# Patient Record
Sex: MALE | Race: WHITE | NOT HISPANIC OR LATINO | Employment: FULL TIME | URBAN - METROPOLITAN AREA
[De-identification: names, ages, dates, MRNs, and addresses within clinical notes are randomized per-mention and may not be internally consistent; named-entity substitution may affect disease eponyms.]

---

## 2017-02-06 RX ORDER — OMEPRAZOLE 40 MG/1
CAPSULE, DELAYED RELEASE ORAL
Qty: 90 CAPSULE | Refills: 2 | Status: SHIPPED | OUTPATIENT
Start: 2017-02-06 | End: 2020-12-31 | Stop reason: CLARIF

## 2017-02-22 ENCOUNTER — LAB (OUTPATIENT)
Dept: LAB | Facility: HOSPITAL | Age: 52
End: 2017-02-22
Attending: UROLOGY

## 2017-02-22 ENCOUNTER — HOSPITAL ENCOUNTER (OUTPATIENT)
Dept: CARDIOLOGY | Facility: HOSPITAL | Age: 52
Discharge: HOME | End: 2017-02-22
Attending: UROLOGY | Admitting: UROLOGY

## 2017-02-22 ENCOUNTER — HOSPITAL ENCOUNTER (OUTPATIENT)
Dept: GENERAL RADIOLOGY | Facility: HOSPITAL | Age: 52
Discharge: HOME | End: 2017-02-22
Attending: UROLOGY

## 2017-02-22 ENCOUNTER — TRANSCRIBE ORDERS (OUTPATIENT)
Dept: LAB | Facility: HOSPITAL | Age: 52
End: 2017-02-22

## 2017-02-22 DIAGNOSIS — Z01.811 PRE-OP CHEST EXAM: ICD-10-CM

## 2017-02-22 DIAGNOSIS — Z01.818 PRE-OP TESTING: ICD-10-CM

## 2017-02-22 DIAGNOSIS — Z01.818 PRE-OP TESTING: Primary | ICD-10-CM

## 2017-02-22 LAB
BASOPHILS # BLD AUTO: 0.04 10*3/MM3 (ref 0–0.2)
BASOPHILS NFR BLD AUTO: 0.6 % (ref 0–1.5)
DEPRECATED RDW RBC AUTO: 44.2 FL (ref 37–54)
EOSINOPHIL # BLD AUTO: 0.1 10*3/MM3 (ref 0–0.7)
EOSINOPHIL NFR BLD AUTO: 1.6 % (ref 0.3–6.2)
ERYTHROCYTE [DISTWIDTH] IN BLOOD BY AUTOMATED COUNT: 13.8 % (ref 11.5–14.5)
HCT VFR BLD AUTO: 46.5 % (ref 40.4–52.2)
HGB BLD-MCNC: 16 G/DL (ref 13.7–17.6)
IMM GRANULOCYTES # BLD: 0.02 10*3/MM3 (ref 0–0.03)
IMM GRANULOCYTES NFR BLD: 0.3 % (ref 0–0.5)
LYMPHOCYTES # BLD AUTO: 1.52 10*3/MM3 (ref 0.9–4.8)
LYMPHOCYTES NFR BLD AUTO: 24.2 % (ref 19.6–45.3)
MCH RBC QN AUTO: 30.1 PG (ref 27–32.7)
MCHC RBC AUTO-ENTMCNC: 34.4 G/DL (ref 32.6–36.4)
MCV RBC AUTO: 87.4 FL (ref 79.8–96.2)
MONOCYTES # BLD AUTO: 0.42 10*3/MM3 (ref 0.2–1.2)
MONOCYTES NFR BLD AUTO: 6.7 % (ref 5–12)
NEUTROPHILS # BLD AUTO: 4.17 10*3/MM3 (ref 1.9–8.1)
NEUTROPHILS NFR BLD AUTO: 66.6 % (ref 42.7–76)
PLATELET # BLD AUTO: 232 10*3/MM3 (ref 140–500)
PMV BLD AUTO: 9.6 FL (ref 6–12)
RBC # BLD AUTO: 5.32 10*6/MM3 (ref 4.6–6)
WBC NRBC COR # BLD: 6.27 10*3/MM3 (ref 4.5–10.7)

## 2017-02-22 PROCEDURE — 93005 ELECTROCARDIOGRAM TRACING: CPT | Performed by: UROLOGY

## 2017-02-22 PROCEDURE — 36415 COLL VENOUS BLD VENIPUNCTURE: CPT

## 2017-02-22 PROCEDURE — 85025 COMPLETE CBC W/AUTO DIFF WBC: CPT

## 2017-02-22 PROCEDURE — 93010 ELECTROCARDIOGRAM REPORT: CPT | Performed by: INTERNAL MEDICINE

## 2017-02-22 PROCEDURE — 71020 HC CHEST PA AND LATERAL: CPT

## 2017-05-19 ENCOUNTER — OFFICE VISIT (OUTPATIENT)
Dept: FAMILY MEDICINE CLINIC | Facility: CLINIC | Age: 52
End: 2017-05-19

## 2017-05-19 VITALS
SYSTOLIC BLOOD PRESSURE: 120 MMHG | OXYGEN SATURATION: 99 % | TEMPERATURE: 98.9 F | WEIGHT: 251.6 LBS | DIASTOLIC BLOOD PRESSURE: 80 MMHG | HEIGHT: 72 IN | BODY MASS INDEX: 34.08 KG/M2 | HEART RATE: 106 BPM

## 2017-05-19 DIAGNOSIS — Z13.220 LIPID SCREENING: ICD-10-CM

## 2017-05-19 DIAGNOSIS — Z51.81 MEDICATION MONITORING ENCOUNTER: ICD-10-CM

## 2017-05-19 DIAGNOSIS — Z00.01 ENCOUNTER FOR GENERAL ADULT MEDICAL EXAMINATION WITH ABNORMAL FINDINGS: Primary | ICD-10-CM

## 2017-05-19 LAB
ALBUMIN SERPL-MCNC: 4.4 G/DL (ref 3.5–5.2)
ALBUMIN/GLOB SERPL: 1.2 G/DL
ALP SERPL-CCNC: 32 U/L (ref 39–117)
ALT SERPL W P-5'-P-CCNC: 34 U/L (ref 1–41)
ANION GAP SERPL CALCULATED.3IONS-SCNC: 13.3 MMOL/L
AST SERPL-CCNC: 32 U/L (ref 1–40)
BILIRUB SERPL-MCNC: 0.7 MG/DL (ref 0.1–1.2)
BUN BLD-MCNC: 18 MG/DL (ref 6–20)
BUN/CREAT SERPL: 14.2 (ref 7–25)
CALCIUM SPEC-SCNC: 9.7 MG/DL (ref 8.6–10.5)
CHLORIDE SERPL-SCNC: 102 MMOL/L (ref 98–107)
CHOLEST SERPL-MCNC: 170 MG/DL (ref 0–200)
CO2 SERPL-SCNC: 24.7 MMOL/L (ref 22–29)
CREAT BLD-MCNC: 1.27 MG/DL (ref 0.76–1.27)
GFR SERPL CREATININE-BSD FRML MDRD: 59 ML/MIN/1.73
GLOBULIN UR ELPH-MCNC: 3.7 GM/DL
GLUCOSE BLD-MCNC: 84 MG/DL (ref 65–99)
HDLC SERPL-MCNC: 32 MG/DL (ref 40–60)
LDLC SERPL CALC-MCNC: 97 MG/DL (ref 0–100)
LDLC/HDLC SERPL: 3.04 {RATIO}
POTASSIUM BLD-SCNC: 4.3 MMOL/L (ref 3.5–5.2)
PROT SERPL-MCNC: 8.1 G/DL (ref 6–8.5)
SODIUM BLD-SCNC: 140 MMOL/L (ref 136–145)
TRIGL SERPL-MCNC: 203 MG/DL (ref 0–150)
VLDLC SERPL-MCNC: 40.6 MG/DL (ref 5–40)

## 2017-05-19 PROCEDURE — 80061 LIPID PANEL: CPT | Performed by: INTERNAL MEDICINE

## 2017-05-19 PROCEDURE — 80053 COMPREHEN METABOLIC PANEL: CPT | Performed by: INTERNAL MEDICINE

## 2017-05-19 PROCEDURE — 99396 PREV VISIT EST AGE 40-64: CPT | Performed by: INTERNAL MEDICINE

## 2017-05-19 RX ORDER — TRAMADOL HYDROCHLORIDE 50 MG/1
50 TABLET ORAL EVERY 6 HOURS PRN
Qty: 30 TABLET | Refills: 0 | Status: SHIPPED | OUTPATIENT
Start: 2017-05-19 | End: 2018-01-05 | Stop reason: SDUPTHER

## 2017-05-31 ENCOUNTER — TELEPHONE (OUTPATIENT)
Dept: FAMILY MEDICINE CLINIC | Facility: CLINIC | Age: 52
End: 2017-05-31

## 2018-01-05 ENCOUNTER — OFFICE VISIT (OUTPATIENT)
Dept: FAMILY MEDICINE CLINIC | Facility: CLINIC | Age: 53
End: 2018-01-05

## 2018-01-05 VITALS
HEART RATE: 80 BPM | RESPIRATION RATE: 18 BRPM | DIASTOLIC BLOOD PRESSURE: 80 MMHG | SYSTOLIC BLOOD PRESSURE: 124 MMHG | BODY MASS INDEX: 31.69 KG/M2 | TEMPERATURE: 97.8 F | HEIGHT: 72 IN | OXYGEN SATURATION: 96 % | WEIGHT: 234 LBS

## 2018-01-05 DIAGNOSIS — M51.36 BULGING LUMBAR DISC: ICD-10-CM

## 2018-01-05 DIAGNOSIS — J01.01 ACUTE RECURRENT MAXILLARY SINUSITIS: Primary | ICD-10-CM

## 2018-01-05 PROBLEM — M51.369 BULGING LUMBAR DISC: Status: ACTIVE | Noted: 2018-01-05

## 2018-01-05 PROCEDURE — 99213 OFFICE O/P EST LOW 20 MIN: CPT | Performed by: NURSE PRACTITIONER

## 2018-01-05 RX ORDER — SILODOSIN 4 MG/1
4 CAPSULE ORAL
COMMUNITY
End: 2019-08-20 | Stop reason: SDUPTHER

## 2018-01-05 RX ORDER — BENZONATATE 100 MG/1
100 CAPSULE ORAL 3 TIMES DAILY PRN
Qty: 30 CAPSULE | Refills: 0 | Status: SHIPPED | OUTPATIENT
Start: 2018-01-05 | End: 2019-08-20

## 2018-01-05 RX ORDER — AZITHROMYCIN 250 MG/1
TABLET, FILM COATED ORAL
Qty: 6 TABLET | Refills: 0 | Status: SHIPPED | OUTPATIENT
Start: 2018-01-05 | End: 2019-08-20

## 2018-01-05 RX ORDER — TRAMADOL HYDROCHLORIDE 50 MG/1
50 TABLET ORAL EVERY 6 HOURS PRN
Qty: 30 TABLET | Refills: 0 | Status: SHIPPED | OUTPATIENT
Start: 2018-01-05 | End: 2019-08-20

## 2018-01-05 NOTE — PATIENT INSTRUCTIONS
erx zpack use as directed, increase H20 and rest, erx tessalon perles 100 prn cough, refill tramadol #30 lasts 6 mo, The patient has read and signed the [facility] Controlled Substance Contract UTD 05/16, kayla reviewed.  I will continue to see patient for regular follow up appointments.  They are well controlled on their medication.  KAYLA is updated every 3 months. The patient is aware of the potential for addiction and dependence

## 2018-01-05 NOTE — PROGRESS NOTES
Subjective   Vini Jones is a 52 y.o. male.     History of Present Illness   C/o prod cough, sinus tenderness and congestion, nasal congestion, PND, no sore throat or ear pain, no SOA or wheezing, no abd pain NV, no body aches, got flu shot this year, wife also sick at home, recent exposure to granddaughter who tested positive for RSV yesterday  With chronic LBP and bulging disc, usually sees Dr Cano and last lumbar xray 09/16, stable on tramadol 50 mg prn pain #30 lasts approx 6 mo and request refill today, last CS contract 05/17    The following portions of the patient's history were reviewed and updated as appropriate: allergies, current medications, past family history, past medical history, past social history, past surgical history and problem list.    Review of Systems   Constitutional: Negative for fever.   HENT: Positive for congestion, facial swelling, postnasal drip, sinus pain and sinus pressure. Negative for dental problem, drooling, ear discharge, ear pain, hearing loss, mouth sores, nosebleeds, rhinorrhea, sneezing, sore throat, tinnitus, trouble swallowing and voice change.    Respiratory: Positive for cough. Negative for shortness of breath and wheezing.    Cardiovascular: Negative for chest pain, palpitations and leg swelling.   Musculoskeletal: Positive for arthralgias and back pain.   Neurological: Negative for dizziness and headaches.   All other systems reviewed and are negative.      Objective   Physical Exam   Constitutional: He is oriented to person, place, and time. He appears well-developed and well-nourished.   HENT:   Head: Normocephalic and atraumatic.   Right Ear: Hearing normal. Tympanic membrane is erythematous.   Left Ear: Hearing normal. Tympanic membrane is erythematous.   Nose: Mucosal edema present. Right sinus exhibits maxillary sinus tenderness and frontal sinus tenderness. Left sinus exhibits maxillary sinus tenderness and frontal sinus tenderness.   Mouth/Throat:  Posterior oropharyngeal erythema present. No oropharyngeal exudate or posterior oropharyngeal edema.   Eyes: Conjunctivae and EOM are normal. Pupils are equal, round, and reactive to light.   Neck: Normal range of motion. Neck supple. No thyromegaly present.   Cardiovascular: Normal rate, regular rhythm and normal heart sounds.    Pulmonary/Chest: Effort normal and breath sounds normal.   Cough   Musculoskeletal: Normal range of motion. He exhibits tenderness (lumbar generalized midline with stiff ROM).   Lymphadenopathy:     He has cervical adenopathy.   Neurological: He is alert and oriented to person, place, and time.   Skin: Skin is warm and dry.   Psychiatric: He has a normal mood and affect. His behavior is normal. Judgment and thought content normal.   Vitals reviewed.      Assessment/Plan   Vini was seen today for cough.    Diagnoses and all orders for this visit:    Acute recurrent maxillary sinusitis    Bulging lumbar disc    Other orders  -     azithromycin (ZITHROMAX Z-AURELIO) 250 MG tablet; Take 2 tablets the first day, then 1 tablet daily for 4 days.  -     benzonatate (TESSALON PERLES) 100 MG capsule; Take 1 capsule by mouth 3 (Three) Times a Day As Needed for Cough.  -     traMADol (ULTRAM) 50 MG tablet; Take 1 tablet by mouth Every 6 (Six) Hours As Needed for Moderate Pain .    erx zpack use as directed, increase H20 and rest, erx tessalon perles 100 prn cough, refill tramadol #30 lasts 6 mo, The patient has read and signed the [facility] Controlled Substance Contract UTD 05/16, kayla reviewed.  I will continue to see patient for regular follow up appointments.  They are well controlled on their medication.  KAYLA is updated every 3 months. The patient is aware of the potential for addiction and dependence.

## 2018-03-14 ENCOUNTER — OFFICE VISIT (OUTPATIENT)
Dept: FAMILY MEDICINE CLINIC | Facility: CLINIC | Age: 53
End: 2018-03-14

## 2018-03-14 ENCOUNTER — HOSPITAL ENCOUNTER (OUTPATIENT)
Dept: CT IMAGING | Facility: HOSPITAL | Age: 53
Discharge: HOME | End: 2018-03-14
Admitting: NURSE PRACTITIONER

## 2018-03-14 VITALS
HEART RATE: 120 BPM | RESPIRATION RATE: 16 BRPM | BODY MASS INDEX: 31.07 KG/M2 | TEMPERATURE: 98.4 F | OXYGEN SATURATION: 97 % | HEIGHT: 72 IN | WEIGHT: 229.4 LBS | SYSTOLIC BLOOD PRESSURE: 80 MMHG | DIASTOLIC BLOOD PRESSURE: 60 MMHG

## 2018-03-14 DIAGNOSIS — R11.2 NAUSEA, VOMITING AND DIARRHEA: ICD-10-CM

## 2018-03-14 DIAGNOSIS — R19.7 NAUSEA, VOMITING AND DIARRHEA: ICD-10-CM

## 2018-03-14 DIAGNOSIS — R10.32 LLQ PAIN: ICD-10-CM

## 2018-03-14 DIAGNOSIS — R68.89 FLU-LIKE SYMPTOMS: Primary | ICD-10-CM

## 2018-03-14 LAB
CREAT BLDA-MCNC: 1.5 MG/DL (ref 0.6–1.3)
ERYTHROCYTE [DISTWIDTH] IN BLOOD BY AUTOMATED COUNT: 13.4 % (ref 4.5–15)
FLUAV AG NPH QL: NEGATIVE
FLUBV AG NPH QL IA: NEGATIVE
HCT VFR BLD AUTO: 49.7 % (ref 35–60)
HGB BLD-MCNC: 16.6 G/DL (ref 13.5–18)
LYMPHOCYTES # BLD AUTO: 0.7 10*3/MM3 (ref 1.2–3.4)
LYMPHOCYTES NFR BLD AUTO: 6.2 % (ref 21–51)
MCH RBC QN AUTO: 29.9 PG (ref 26.1–33.1)
MCHC RBC AUTO-ENTMCNC: 33.5 G/DL (ref 33–37)
MCV RBC AUTO: 89.5 FL (ref 80–99)
MONOCYTES # BLD AUTO: 0.2 10*3/MM3 (ref 0.1–0.6)
MONOCYTES NFR BLD AUTO: 1.7 % (ref 2–9)
NEUTROPHILS # BLD AUTO: 10.1 10*3/MM3 (ref 1.4–6.5)
NEUTROPHILS NFR BLD AUTO: 92.1 % (ref 42–75)
PLATELET # BLD AUTO: 249 10*3/MM3 (ref 150–450)
PMV BLD AUTO: 7.5 FL (ref 7.1–10.5)
RBC # BLD AUTO: 5.55 10*6/MM3 (ref 4–6)
WBC NRBC COR # BLD: 11 10*3/MM3 (ref 4.5–10)

## 2018-03-14 PROCEDURE — 0 IOPAMIDOL 61 % SOLUTION: Performed by: NURSE PRACTITIONER

## 2018-03-14 PROCEDURE — 82565 ASSAY OF CREATININE: CPT

## 2018-03-14 PROCEDURE — 99213 OFFICE O/P EST LOW 20 MIN: CPT | Performed by: NURSE PRACTITIONER

## 2018-03-14 PROCEDURE — 36415 COLL VENOUS BLD VENIPUNCTURE: CPT | Performed by: NURSE PRACTITIONER

## 2018-03-14 PROCEDURE — 85025 COMPLETE CBC W/AUTO DIFF WBC: CPT | Performed by: NURSE PRACTITIONER

## 2018-03-14 PROCEDURE — 74177 CT ABD & PELVIS W/CONTRAST: CPT

## 2018-03-14 PROCEDURE — 87804 INFLUENZA ASSAY W/OPTIC: CPT | Performed by: NURSE PRACTITIONER

## 2018-03-14 RX ORDER — ONDANSETRON 4 MG/1
4 TABLET, FILM COATED ORAL EVERY 8 HOURS PRN
Qty: 20 TABLET | Refills: 0 | Status: SHIPPED | OUTPATIENT
Start: 2018-03-14 | End: 2019-08-20

## 2018-03-14 RX ORDER — ALFUZOSIN HYDROCHLORIDE 10 MG/1
TABLET, EXTENDED RELEASE ORAL
COMMUNITY
Start: 2018-02-05 | End: 2019-08-20

## 2018-03-14 RX ADMIN — IOPAMIDOL 85 ML: 612 INJECTION, SOLUTION INTRAVENOUS at 18:15

## 2018-03-14 NOTE — PATIENT INSTRUCTIONS
CBC today, WBC 11.0, flu negative.   Stat CT abd today will call with results.   Encouraged bland diet advance as tolerated.   zofran 4mg every 8 hours as needed for nausea or vomiting.   If any worsening abd pain advised to go to the ER.   Increase fluid intake, get plenty of rest.   Patient agrees with plan of care and understands instructions. Call if worsening symptoms or any problems or concerns.

## 2018-03-14 NOTE — NURSING NOTE
Dr Ramirez read CT Abdomen/pelvis:  Minimal diverticulosis, no diverticulitis,  No acute.  Results then called to Dr bill Caballero who spoke directly to patient's wife.  May go home.  IV site clear, removed.  To home with wife, ambulatory, no acute distress.

## 2018-03-14 NOTE — PROGRESS NOTES
Subjective   Vini Jones is a 52 y.o. male.     History of Present Illness   C/o chills, aches, fever, HA, nausea, diarrhea, he did take advil this morning, he states low grade temp 99.7 at home, his wife is in the room, his wife has also been sick,symptoms started last night, he denies being around anyone sick. He denies vomiting, he denies sore throat, denies cough. He states he had cold last week, he did eat toast today, denies drinking liquids.  He did get the flu vaccine.  He does have hx of diverticulosis, he did have appendectomy and cholecystectomy. He states he has had left abd pain for years,he states he has generalized abd pain today.   He did vomit while in the room.     The following portions of the patient's history were reviewed and updated as appropriate: allergies, current medications, past family history, past medical history, past social history, past surgical history and problem list.    Review of Systems   Constitutional: Positive for appetite change, chills, diaphoresis, fatigue and fever.   HENT: Negative for congestion, ear pain, sinus pressure and sore throat.    Respiratory: Negative for cough and shortness of breath.    Cardiovascular: Negative for chest pain.   Gastrointestinal: Positive for abdominal pain, diarrhea, nausea and vomiting.   Musculoskeletal: Positive for myalgias. Negative for arthralgias.   Skin: Negative for pallor.   Neurological: Negative for dizziness, light-headedness and headaches.   All other systems reviewed and are negative.      Objective   Physical Exam   Constitutional: He is oriented to person, place, and time. He appears well-developed and well-nourished.   HENT:   Head: Normocephalic.   Right Ear: Tympanic membrane and ear canal normal.   Left Ear: Tympanic membrane and ear canal normal.   Nose: Nose normal.   Mouth/Throat: Uvula is midline, oropharynx is clear and moist and mucous membranes are normal.   Eyes: Pupils are equal, round, and reactive to  light.   Cardiovascular: Normal rate, regular rhythm and normal heart sounds.    Pulmonary/Chest: Effort normal and breath sounds normal. No respiratory distress. He has no decreased breath sounds. He has no wheezes. He has no rhonchi.   Abdominal: Soft. Bowel sounds are normal. He exhibits no distension. There is no hepatosplenomegaly. There is tenderness in the left lower quadrant.   Musculoskeletal: Normal range of motion.   Neurological: He is alert and oriented to person, place, and time.   Skin: Skin is warm and dry.   Psychiatric: He has a normal mood and affect. His behavior is normal.   Nursing note and vitals reviewed.        Assessment/Plan   Vini was seen today for generalized body aches.    Diagnoses and all orders for this visit:    Flu-like symptoms  -     Influenza Antigen, Rapid - Swab, Nasopharynx  -     CBC & Differential  -     CBC Auto Differential    Nausea, vomiting and diarrhea  -     CT Abdomen Pelvis With Contrast; Future    LLQ pain  -     CT Abdomen Pelvis With Contrast; Future    Other orders  -     ondansetron (ZOFRAN) 4 MG tablet; Take 1 tablet by mouth Every 8 (Eight) Hours As Needed for Nausea or Vomiting.      CBC today, WBC 11.0, flu negative.   Stat CT abd today will call with results.   Encouraged bland diet advance as tolerated.   zofran 4mg every 8 hours as needed for nausea or vomiting.   If any worsening abd pain advised to go to the ER.   Increase fluid intake, get plenty of rest.   Patient agrees with plan of care and understands instructions. Call if worsening symptoms or any problems or concerns.

## 2018-03-15 ENCOUNTER — TELEPHONE (OUTPATIENT)
Dept: FAMILY MEDICINE CLINIC | Facility: CLINIC | Age: 53
End: 2018-03-15

## 2018-03-15 NOTE — TELEPHONE ENCOUNTER
----- Message from ERIC Das sent at 3/15/2018  7:56 AM EDT -----  Please call patient with results. His CT abd was normal. It did however show enlarged prostate. Does he see urology for this and when was last prostate exam? Call office for f/u if symptoms persist or ER if any worsening abd pain.    [facility]

## 2018-03-15 NOTE — TELEPHONE ENCOUNTER
----- Message from ERIC Das sent at 3/15/2018  7:56 AM EDT -----  Please call patient with results. His CT abd was normal. It did however show enlarged prostate. Does he see urology for this and when was last prostate exam? Call office for f/u if symptoms persist or ER if any worsening abd pain.    Pt informed of CT results. Pt said he does see a Urologist and last saw on about 6 mos ago,he has a FU with Uro in about 2 wks

## 2018-07-15 ENCOUNTER — APPOINTMENT (OUTPATIENT)
Dept: GENERAL RADIOLOGY | Facility: HOSPITAL | Age: 53
End: 2018-07-15

## 2018-07-15 ENCOUNTER — HOSPITAL ENCOUNTER (EMERGENCY)
Facility: HOSPITAL | Age: 53
Discharge: HOME | End: 2018-07-15
Attending: EMERGENCY MEDICINE | Admitting: EMERGENCY MEDICINE

## 2018-07-15 VITALS
HEIGHT: 72 IN | RESPIRATION RATE: 16 BRPM | TEMPERATURE: 98.5 F | OXYGEN SATURATION: 94 % | WEIGHT: 235 LBS | DIASTOLIC BLOOD PRESSURE: 82 MMHG | HEART RATE: 87 BPM | SYSTOLIC BLOOD PRESSURE: 119 MMHG | BODY MASS INDEX: 31.83 KG/M2

## 2018-07-15 DIAGNOSIS — R33.8 ACUTE URINARY RETENTION: Primary | ICD-10-CM

## 2018-07-15 PROCEDURE — 51702 INSERT TEMP BLADDER CATH: CPT

## 2018-07-15 PROCEDURE — 74018 RADEX ABDOMEN 1 VIEW: CPT

## 2018-07-15 PROCEDURE — 99283 EMERGENCY DEPT VISIT LOW MDM: CPT

## 2018-07-15 NOTE — ED NOTES
"Pt reports that the last time he had a BM was 4 days ago. Pt reports that he is also having difficulty urinating. \"I am trickling.\" pt reports pain in the lower abd and in his \"penis.\"     Vanessa Pearce RN  07/15/18 0359    "

## 2018-07-15 NOTE — ED NOTES
Lag catheter bag placed. Patient educated on catheter care. Patient and patient's wife verbalized understanding.      Selina Nichols RN  07/15/18 0670

## 2018-07-15 NOTE — ED NOTES
Drainage bag at 700cc, clamped at this time. Will continue to monitor.     Alaina Ashby RN  07/15/18 5764

## 2018-07-15 NOTE — ED PROVIDER NOTES
" EMERGENCY DEPARTMENT ENCOUNTER    CHIEF COMPLAINT  Chief Complaint: Urinary retention  History given by: patient   History limited by: n/a  Room Number: 13/13  PMD: MD Dr Felix Will, urology    HPI:  Pt is a 52 y.o. male who presents complaining of urinary retention. Pt states that he has only been able to \"dribble\". Pt also complains of constipation for the past 4 days. Pt states that he took Mirolax, but has only been able to pass liquid stool. Pt states that he has a hx of BPH.     Onset: gradual  Timing: constant   Location: urinary   Radiation: n/a  Quality: retention, only able to pass small amounts of urine  Intensity/Severity: severe   Progression: unchanged  Associated Symptoms: constipation   Aggravating Factors: none  Alleviating Factors: none  Previous Episodes: hx of BPH    PAST MEDICAL HISTORY  Active Ambulatory Problems     Diagnosis Date Noted   • Bulging lumbar disc 01/05/2018     Resolved Ambulatory Problems     Diagnosis Date Noted   • No Resolved Ambulatory Problems     Past Medical History:   Diagnosis Date   • Abdominal pain    • Enlarged prostate    • GERD (gastroesophageal reflux disease)        PAST SURGICAL HISTORY  Past Surgical History:   Procedure Laterality Date   • BIOPSY PROSTATE NEEDLE / PUNCH / INCISIONAL     • COLONOSCOPY         FAMILY HISTORY  Family History   Problem Relation Age of Onset   • Heart disease Father    • Asthma Father    • Heart attack Father        SOCIAL HISTORY  Social History     Social History   • Marital status:      Spouse name: N/A   • Number of children: N/A   • Years of education: N/A     Occupational History   • Not on file.     Social History Main Topics   • Smoking status: Never Smoker   • Smokeless tobacco: Never Used   • Alcohol use No   • Drug use: No   • Sexual activity: Not on file     Other Topics Concern   • Not on file     Social History Narrative   • No narrative on file       ALLERGIES  Patient has no known " allergies.    REVIEW OF SYSTEMS  Review of Systems   Constitutional: Negative for chills and fever.   HENT: Negative for sore throat.    Gastrointestinal: Positive for constipation. Negative for nausea and vomiting.   Genitourinary: Positive for difficulty urinating. Negative for dysuria.   Musculoskeletal: Negative for back pain.   Skin: Negative for rash.   Psychiatric/Behavioral: The patient is not nervous/anxious.        PHYSICAL EXAM  ED Triage Vitals   Temp Heart Rate Resp BP SpO2   07/15/18 0359 07/15/18 0359 07/15/18 0359 07/15/18 0422 07/15/18 0359   98.5 °F (36.9 °C) 114 18 134/82 98 %      Temp src Heart Rate Source Patient Position BP Location FiO2 (%)   07/15/18 0359 -- 07/15/18 0422 07/15/18 0422 --   Tympanic  Lying Right arm        Physical Exam   Constitutional: He is oriented to person, place, and time. No distress.   HENT:   Head: Normocephalic and atraumatic.   Eyes: EOM are normal. Pupils are equal, round, and reactive to light.   Neck: Normal range of motion. Neck supple.   Cardiovascular: Normal rate, regular rhythm and normal heart sounds.    Pulmonary/Chest: Effort normal and breath sounds normal. No respiratory distress.   Abdominal: Soft. There is no tenderness. There is no rebound and no guarding.   Musculoskeletal: Normal range of motion. He exhibits no edema.   Neurological: He is alert and oriented to person, place, and time. He has normal sensation and normal strength.   Skin: Skin is warm and dry.   Psychiatric: Mood and affect normal.   Nursing note and vitals reviewed.      RADIOLOGY  XR Abdomen KUB   Preliminary Result   No acute findings in the abdomen.                           I ordered the above noted radiological studies. Interpreted by radiologist. Reviewed by me in PACS.       PROCEDURES  Procedures      PROGRESS AND CONSULTS       04;20  Bladder scan shows >999 in the bladder. Sifuentes catheter ordered.     04:23  BP- 134/82 HR- 114 Temp- 98.5 °F (36.9 °C) (Tympanic) O2 sat-  98%  Pt reports improvement after palacios cathter. Plan for XR abd kub. Pt understands and agrees with the plan, all questions answered.    04;29  XR abd kub ordered.     05:36  BP- 119/82 HR- 87 Temp- 98.5 °F (36.9 °C) (Tympanic) O2 sat- 94%  Rechecked the patient who is in NAD and is resting comfortably. Advised pt that the XR shows no obstruction. Plan for discharge, pt is to f/u with his urologist. Pt understands and agrees with the plan, all questions answered.    MEDICAL DECISION MAKING  Results were reviewed/discussed with the patient and they were also made aware of online access. Pt also made aware that some labs, such as cultures, will not be resulted during ER visit and follow up with PMD is necessary.     MDM  Number of Diagnoses or Management Options     Amount and/or Complexity of Data Reviewed  Tests in the radiology section of CPT®: reviewed and ordered (XR abd kub shows NAD)    Patient Progress  Patient progress: stable         DIAGNOSIS  Final diagnoses:   Acute urinary retention       DISPOSITION  DISCHARGE    Patient discharged in stable condition.    Reviewed implications of results, diagnosis, meds, responsibility to follow up, warning signs and symptoms of possible worsening, potential complications and reasons to return to ER.    Patient/Family voiced understanding of above instructions.    Discussed plan for discharge, as there is no emergent indication for admission. Patient referred to primary care provider for BP management due to today's BP. Pt/family is agreeable and understands need for follow up and repeat testing.  Pt is aware that discharge does not mean that nothing is wrong but it indicates no emergency is present that requires admission and they must continue care with follow-up as given below or physician of their choice.     FOLLOW-UP  Narayan Cano Jr., MD  [address] [place], [place] [id number] [phone]    Schedule an appointment as soon as possible for a visit        Felix Pearce MD  [address] [facility] [address]  [phone]    Schedule an appointment as soon as possible for a visit            Medication List      No changes were made to your prescriptions during this visit.       Latest Documented Vital Signs:  As of 6:03 AM  BP- 119/82 HR- 87 Temp- 98.5 °F (36.9 °C) (Tympanic) O2 sat- 94%    --  Documentation assistance provided by zoey Oreilly for Dr Hooks.  Information recorded by the zoey was done at my direction and has been verified and validated by me.     Em Oreilly  07/15/18 0554       Micky Hooks MD  07/15/18 0602

## 2018-07-18 ENCOUNTER — TELEPHONE (OUTPATIENT)
Dept: SOCIAL WORK | Facility: HOSPITAL | Age: 53
End: 2018-07-18

## 2018-09-14 ENCOUNTER — APPOINTMENT (OUTPATIENT)
Dept: PREADMISSION TESTING | Facility: HOSPITAL | Age: 53
End: 2018-09-14

## 2019-07-24 ENCOUNTER — OFFICE VISIT (OUTPATIENT)
Dept: ORTHOPEDIC SURGERY | Facility: CLINIC | Age: 54
End: 2019-07-24

## 2019-07-24 VITALS — WEIGHT: 235 LBS | BODY MASS INDEX: 31.83 KG/M2 | TEMPERATURE: 98.1 F | HEIGHT: 72 IN

## 2019-07-24 DIAGNOSIS — M77.8 LEFT WRIST TENDONITIS: ICD-10-CM

## 2019-07-24 DIAGNOSIS — M65.9 TENOSYNOVITIS OF LEFT WRIST: Primary | ICD-10-CM

## 2019-07-24 PROCEDURE — 20605 DRAIN/INJ JOINT/BURSA W/O US: CPT | Performed by: NURSE PRACTITIONER

## 2019-07-24 PROCEDURE — 73110 X-RAY EXAM OF WRIST: CPT | Performed by: NURSE PRACTITIONER

## 2019-07-24 PROCEDURE — 99203 OFFICE O/P NEW LOW 30 MIN: CPT | Performed by: NURSE PRACTITIONER

## 2019-07-24 RX ORDER — SILODOSIN 8 MG/1
CAPSULE ORAL
COMMUNITY
Start: 2019-05-22

## 2019-07-24 RX ADMIN — METHYLPREDNISOLONE ACETATE 80 MG: 80 INJECTION, SUSPENSION INTRA-ARTICULAR; INTRALESIONAL; INTRAMUSCULAR; SOFT TISSUE at 15:20

## 2019-07-24 NOTE — PROGRESS NOTES
"Patient: Vini Jones    YOB: 1965    Medical Record Number: [id number]    Chief Complaints:  Left wrist pain    History of Present Illness:     53 y.o. Male patient who presents with recent history of left wrist pain.  Onset of pain began 6 weeks ago.  Recalls using a  for \"6 hours\" while cleaning out his garage.  Later that night and the next 2 days his pain was intense.  Reports his pain improved somewhat, but has been more sensitive over the last 2 weeks.  Describes his pain as mild, constant, and aching, but sharp at times.  Lifting and pushing aggravate his pain.  Symptoms alleviated by rest and wrist splint.  Localizes the majority of his  pain to lateral aspect of wrist over radial styloid, but also reports discomfort over the medial aspect of the wrist.  Denies any paresthesias, numbness or weakness.  No other associated symptoms.  Patient is right hand dominant.    Allergies: No Known Allergies    Home Medications:    Current Outpatient Medications:   •  silodosin (RAPAFLO) 8 MG capsule capsule, , Disp: , Rfl:   •  alfuzosin (UROXATRAL) 10 MG 24 hr tablet, , Disp: , Rfl:   •  AXIRON 30 MG/ACT solution, , Disp: , Rfl:   •  azithromycin (ZITHROMAX Z-AURELIO) 250 MG tablet, Take 2 tablets the first day, then 1 tablet daily for 4 days., Disp: 6 tablet, Rfl: 0  •  benzonatate (TESSALON PERLES) 100 MG capsule, Take 1 capsule by mouth 3 (Three) Times a Day As Needed for Cough., Disp: 30 capsule, Rfl: 0  •  omeprazole (priLOSEC) 40 MG capsule, TAKE 1 CAPSULE DAILY, Disp: 90 capsule, Rfl: 2  •  ondansetron (ZOFRAN) 4 MG tablet, Take 1 tablet by mouth Every 8 (Eight) Hours As Needed for Nausea or Vomiting., Disp: 20 tablet, Rfl: 0  •  silodosin (RAPAFLO) 4 MG capsule capsule, Take 4 mg by mouth Daily With Breakfast., Disp: , Rfl:   •  traMADol (ULTRAM) 50 MG tablet, Take 1 tablet by mouth Every 6 (Six) Hours As Needed for Moderate Pain ., Disp: 30 tablet, Rfl: 0    Past Medical History: " "  Diagnosis Date   • Abdominal pain    • Enlarged prostate    • GERD (gastroesophageal reflux disease)      Past Surgical History:   Procedure Laterality Date   • BIOPSY PROSTATE NEEDLE / PUNCH / INCISIONAL     • COLONOSCOPY         Social History     Occupational History   • Not on file   Tobacco Use   • Smoking status: Never Smoker   • Smokeless tobacco: Never Used   Substance and Sexual Activity   • Alcohol use: No   • Drug use: No   • Sexual activity: Defer       Social History     Social History Narrative   • Not on file     Family History   Problem Relation Age of Onset   • Heart disease Father    • Asthma Father    • Heart attack Father        Review of Systems:      Constitutional: Denies fever, shaking or chills   Eyes: Denies change in visual acuity   HEENT: Denies nasal congestion or sore throat   Respiratory: Denies cough or shortness of breath   Cardiovascular: Denies chest pain or edema  Endocrine: Denies tremors, palpitations, intolerance of heat or cold, polyuria, polydipsia.  GI: Denies abdominal pain, nausea, vomiting, bloody stools or diarrhea  : Denies frequency, urgency, incontinence, retention, or nocturia.  Musculoskeletal: Denies numbness tingling or loss of motor function except as above  Integument: Denies rash, lesion or ulceration   Neurologic: Denies headache or focal weakness, deficits  Heme: Denies epistaxis, spontaneous or excessive bleeding, epistaxis, hematuria, melena, fatigue, enlarged or tender lymph nodes.      All other pertinent positives and negatives as noted above in HPI.    Physical Exam: 53 y.o.  male    Vitals:    07/24/19 1444   Temp: 98.1 °F (36.7 °C)   Weight: 107 kg (235 lb)   Height: 182.9 cm (72\")       General:  Patient is awake and alert.  Appears in no acute distress or discomfort.    Psych:  Affect and demeanor are appropriate.    Eyes:  Conjunctiva and sclera appear grossly normal.  Eyes track well and EOM seem to be intact.    Ears:  No gross abnormalities. "  Hearing adequate for the exam.    Dentition:  No gross abnormalities noted.    Cardiovascular:  Regular rate and rhythm.    Lungs:  Good chest expansion.  Breathing unlabored.    Lymph:  No palpable masses or adenopathy in left upper extremity.    Left upper extremity:   Skin is benign.  No obvious swellings, masses or atrophy.  No palpable masses or adenopathy.  Focal tenderness noted over radial aspect of wrist and radial styloid.  Slight tenderness over the medial portion of the wrist.  Full elbow, wrist ROM.  No instability.  Positive Finkelstein's test.  Negative Tinel's, Phalen's over carpal tunnel.  Good , pinch, finger and thumb abduction strength.  Intact sensation.  Good radial pulse.  Brisk cap refill         Radiology:   Dr. Snow and I reviewed the x-rays together.  AP, oblique, and lateral views of the left wrist are ordered by myself and reviewed to evaluate the patient's complaint.  No comparison films are immediately available.  The x-rays show no obvious acute abnormalities, lesions, masses, significant degenerative changes, or other concerning findings.    Assessment:  1.  Left DeQuervain's tenosynovitis  2.  Left wrist tendinitis    Plan:  We discussed all available treatments for this condition in detail, both surgical and non-surgical.  With regards to conservative treatment, we discussed anti-inflammatories, injections, and use of a splint.  I have recommended that we start with an injection for the DeQuervain's.  For his wrist in general, we discussed activity modification and use of a wrist brace or ace wrap over the next 2 weeks to allow the wrist to rest..    The risks, benefits, and alternatives to an injection were thoroughly discussed and the patient consented to proceed.    Patient will follow up with me in 4 weeks for re-evaluation.     Deidre Curiel, APRN    07/24/2019      CC to Narayan Cano Jr., MD      Medium Joint Arthrocentesis: L radiocarpal  Date/Time: 7/24/2019  3:20 PM  Consent given by: patient  Site marked: site marked  Timeout: Immediately prior to procedure a time out was called to verify the correct patient, procedure, equipment, support staff and site/side marked as required   Supporting Documentation  Indications: pain   Procedure Details  Location: wrist - L radiocarpal  Preparation: Patient was prepped and draped in the usual sterile fashion  Needle gauge: 27g.  Approach: anteromedial  Medications administered: 80 mg methylPREDNISolone acetate 80 MG/ML (1 cc Lidocaine 2% NDC: 91604-389-91 LOT: [id number] EXP: 12/31/21)  Patient tolerance: patient tolerated the procedure well with no immediate complications

## 2019-07-25 RX ORDER — METHYLPREDNISOLONE ACETATE 80 MG/ML
80 INJECTION, SUSPENSION INTRA-ARTICULAR; INTRALESIONAL; INTRAMUSCULAR; SOFT TISSUE
Status: COMPLETED | OUTPATIENT
Start: 2019-07-24 | End: 2019-07-24

## 2019-08-20 ENCOUNTER — OFFICE VISIT (OUTPATIENT)
Dept: FAMILY MEDICINE CLINIC | Facility: CLINIC | Age: 54
End: 2019-08-20

## 2019-08-20 VITALS
SYSTOLIC BLOOD PRESSURE: 126 MMHG | DIASTOLIC BLOOD PRESSURE: 84 MMHG | OXYGEN SATURATION: 98 % | HEART RATE: 80 BPM | BODY MASS INDEX: 32.97 KG/M2 | HEIGHT: 72 IN | TEMPERATURE: 98.9 F | WEIGHT: 243.4 LBS

## 2019-08-20 DIAGNOSIS — M54.50 CHRONIC LOW BACK PAIN WITHOUT SCIATICA, UNSPECIFIED BACK PAIN LATERALITY: ICD-10-CM

## 2019-08-20 DIAGNOSIS — Z51.81 MEDICATION MONITORING ENCOUNTER: ICD-10-CM

## 2019-08-20 DIAGNOSIS — G89.29 CHRONIC LOW BACK PAIN WITHOUT SCIATICA, UNSPECIFIED BACK PAIN LATERALITY: ICD-10-CM

## 2019-08-20 DIAGNOSIS — Z12.11 ENCOUNTER FOR COLORECTAL CANCER SCREENING: ICD-10-CM

## 2019-08-20 DIAGNOSIS — Z12.12 ENCOUNTER FOR COLORECTAL CANCER SCREENING: ICD-10-CM

## 2019-08-20 DIAGNOSIS — L72.3 SEBACEOUS CYST OF LEFT AXILLA: Primary | ICD-10-CM

## 2019-08-20 PROCEDURE — 99214 OFFICE O/P EST MOD 30 MIN: CPT | Performed by: INTERNAL MEDICINE

## 2019-08-20 RX ORDER — TRAMADOL HYDROCHLORIDE 50 MG/1
50 TABLET ORAL EVERY 6 HOURS PRN
Qty: 30 TABLET | Refills: 0 | Status: SHIPPED | OUTPATIENT
Start: 2019-08-20 | End: 2020-03-03 | Stop reason: SDUPTHER

## 2019-08-20 RX ORDER — CLINDAMYCIN HYDROCHLORIDE 300 MG/1
300 CAPSULE ORAL 3 TIMES DAILY
Qty: 30 CAPSULE | Refills: 0 | Status: SHIPPED | OUTPATIENT
Start: 2019-08-20 | End: 2019-08-30

## 2019-08-20 NOTE — PROGRESS NOTES
Subjective   Vini Jones is a 54 y.o. male.   Left axillary lump present for about 2 weeks time no particular injury to that.  No drainage increased fever or known infection  History of Present Illness   Recent left axillary lump new patient not a problem problems with that does not have a history of hidradenitis Suppuritiva.  No unexplained weight loss night sweats sensation of fatigue or feeling bad.  No  lymph nodes popping up elsewhere.  Patient does need refill of tramadol needs to medication Viagra that he takes his as needed for low back pain mainly when he does yard work or tends to overdo it does not have sciatica type complaints with it typically prescriptive lasting about a year but we do need updated UDS per joanie and Sg patient asked about other screenings apparently has had a history: Polyps he thinks he is a little bit late by about a year on colonoscopy so we will schedule him he is seeing Dr. Shun Ca before so we will schedule with him for that.  No recent changes in bowel habits etc.  Drug allergies are none.  Family history for heart disease specifically heart failure asthma and heart attack non-smoker  Review of Systems   All other systems reviewed and are negative.      Objective   Vitals:    08/20/19 0812   BP: 126/84   Pulse: 80   Temp: 98.9 °F (37.2 °C)   SpO2: 98%   Weight: 110 kg (243 lb 6.4 oz)     Physical Exam   Constitutional: He appears well-developed and well-nourished.   HENT:   Head: Normocephalic and atraumatic.   Eyes: Conjunctivae are normal. Pupils are equal, round, and reactive to light.   Neck:   No increased cervical nodes   Cardiovascular: Normal rate, regular rhythm and normal heart sounds.   Pulmonary/Chest: Effort normal and breath sounds normal.   Abdominal: Soft. Bowel sounds are normal.   Musculoskeletal:   There is a 2 cm superficial mass left axilla do not see a poor given his proximity to his service thinks is like a sebaceous cyst it is sore but not  red or and not draining.  Do not find any other masses within the left axilla do not find any lymphadenopathy either axilla or cervical or supra/infra clavicular.   Neurological: He is alert.   Unremarkable gait and station   Skin: Skin is warm and dry.   Nursing note and vitals reviewed.      No results found for: INR    Procedures    Assessment/Plan   1.  Sebaceous cyst back to the left axilla plan treatment with clindamycin 300 3 times daily x10 days if the soreness got gone 10 days if mass is not totally resolved in 1 month's time we will get general surgery referral.  If this should worsen or increase in size we will proceed with a surgeon.  S patient will let me know if the mass is not totally resolved in 1 month's time for probable referral at that point    2.  Chronic lumbar back pain plan refill tramadol    3.  Medication monitoring as an #2 with [facility] drug contract Sg    4.  Encounter for colorectal screening referred to Dr. Ibrahim for colonoscopy with a personal history for colon polyps.    Much of this encounter note is an electronic transcription/translation of spoken language to printed text.  The electronic translation of spoken language may permit erroneous, or at times, nonsensical words or phrases to be inadvertently transcribed.  Although I have reviewed the note for such errors, some may still exist. If there are questions or for further clarification, please contact me.

## 2019-08-21 ENCOUNTER — OFFICE VISIT (OUTPATIENT)
Dept: ORTHOPEDIC SURGERY | Facility: CLINIC | Age: 54
End: 2019-08-21

## 2019-08-21 VITALS — HEIGHT: 72 IN | TEMPERATURE: 98 F | WEIGHT: 243 LBS | BODY MASS INDEX: 32.91 KG/M2

## 2019-08-21 DIAGNOSIS — M77.8 LEFT WRIST TENDONITIS: ICD-10-CM

## 2019-08-21 DIAGNOSIS — M65.9 TENOSYNOVITIS OF LEFT WRIST: Primary | ICD-10-CM

## 2019-08-21 PROCEDURE — 99212 OFFICE O/P EST SF 10 MIN: CPT | Performed by: NURSE PRACTITIONER

## 2019-08-21 NOTE — PROGRESS NOTES
Chief Complaint:  Follow up left wrist pain    HPI:  Mr. Jones returns to clinic for follow up left wrist pain.  Reports the shot helped tremendously.  He rarely has intermittent pain.  He is very happy with his improvement.     Exam:  Left wrist:  Skin is benign.  No gross abnormalities on inspection.  No palpable masses or adenopathy.  No effusion.  Focal tenderness over the radial aspect of wrist and radial styloid only with deep palpation.  Full symmetric motion.  No instability.  Good strength with resisted forearm pronation and supination, wrist flexion and extension, ulnar and radial deviation, , pinch, finger and thumb abduction.  Negative Finkelstein's test.  Sensation is intact.  Brisk capillary refill in the fingers with good skin turgor.    Imaging:  None taken    Assessment:  1.  Left DeQuervain's tenosynovitis  2.  Left wrist tendinitis    Plan:  He seems to be doing very well.  Going forward, he will follow up with me as needed.     ERIC Livingston     08/21/2019

## 2019-08-23 LAB — CONV REPORT SUMMARY: NORMAL

## 2019-10-02 ENCOUNTER — TELEPHONE (OUTPATIENT)
Dept: FAMILY MEDICINE CLINIC | Facility: CLINIC | Age: 54
End: 2019-10-02

## 2019-10-02 DIAGNOSIS — H91.93 DECREASED HEARING OF BOTH EARS: Primary | ICD-10-CM

## 2019-10-10 PROBLEM — Z86.010 SURVEILLANCE DUE TO PRIOR COLONIC NEOPLASIA: Status: ACTIVE | Noted: 2019-10-11

## 2019-10-11 ENCOUNTER — OFFICE (AMBULATORY)
Dept: URBAN - METROPOLITAN AREA PATHOLOGY 4 | Facility: PATHOLOGY | Age: 54
End: 2019-10-11
Payer: COMMERCIAL

## 2019-10-11 ENCOUNTER — AMBULATORY SURGICAL CENTER (AMBULATORY)
Dept: URBAN - METROPOLITAN AREA SURGERY 17 | Facility: SURGERY | Age: 54
End: 2019-10-11
Payer: COMMERCIAL

## 2019-10-11 DIAGNOSIS — D12.4 BENIGN NEOPLASM OF DESCENDING COLON: ICD-10-CM

## 2019-10-11 DIAGNOSIS — D12.2 BENIGN NEOPLASM OF ASCENDING COLON: ICD-10-CM

## 2019-10-11 DIAGNOSIS — Z86.010 PERSONAL HISTORY OF COLONIC POLYPS: ICD-10-CM

## 2019-10-11 LAB
GI HISTOLOGY: A. UNSPECIFIED: (no result)
GI HISTOLOGY: B. UNSPECIFIED: (no result)
GI HISTOLOGY: PDF REPORT: (no result)

## 2019-10-11 PROCEDURE — 45380 COLONOSCOPY AND BIOPSY: CPT | Mod: 59,33 | Performed by: INTERNAL MEDICINE

## 2019-10-11 PROCEDURE — 45380 COLONOSCOPY AND BIOPSY: CPT | Mod: 33,59 | Performed by: INTERNAL MEDICINE

## 2019-10-11 PROCEDURE — 88305 TISSUE EXAM BY PATHOLOGIST: CPT | Mod: 33 | Performed by: INTERNAL MEDICINE

## 2019-10-11 PROCEDURE — 45385 COLONOSCOPY W/LESION REMOVAL: CPT | Mod: 33 | Performed by: INTERNAL MEDICINE

## 2019-11-25 ENCOUNTER — OFFICE VISIT (OUTPATIENT)
Dept: FAMILY MEDICINE CLINIC | Facility: CLINIC | Age: 54
End: 2019-11-25

## 2019-11-25 VITALS
BODY MASS INDEX: 32.86 KG/M2 | SYSTOLIC BLOOD PRESSURE: 128 MMHG | DIASTOLIC BLOOD PRESSURE: 84 MMHG | HEIGHT: 72 IN | HEART RATE: 81 BPM | TEMPERATURE: 98.5 F | OXYGEN SATURATION: 97 % | WEIGHT: 242.6 LBS

## 2019-11-25 DIAGNOSIS — Z13.220 LIPID SCREENING: ICD-10-CM

## 2019-11-25 DIAGNOSIS — R03.0 ELEVATED BP WITHOUT DIAGNOSIS OF HYPERTENSION: ICD-10-CM

## 2019-11-25 DIAGNOSIS — L30.9 DERMATITIS: ICD-10-CM

## 2019-11-25 DIAGNOSIS — R06.09 DOE (DYSPNEA ON EXERTION): Primary | ICD-10-CM

## 2019-11-25 LAB
ALBUMIN SERPL-MCNC: 4.5 G/DL (ref 3.5–5.2)
ALBUMIN/GLOB SERPL: 1.3 G/DL
ALP SERPL-CCNC: 39 U/L (ref 39–117)
ALT SERPL W P-5'-P-CCNC: 26 U/L (ref 1–41)
ANION GAP SERPL CALCULATED.3IONS-SCNC: 12.4 MMOL/L (ref 5–15)
AST SERPL-CCNC: 28 U/L (ref 1–40)
BILIRUB SERPL-MCNC: 0.4 MG/DL (ref 0.2–1.2)
BUN BLD-MCNC: 24 MG/DL (ref 6–20)
BUN/CREAT SERPL: 17.3 (ref 7–25)
CALCIUM SPEC-SCNC: 9.7 MG/DL (ref 8.6–10.5)
CHLORIDE SERPL-SCNC: 103 MMOL/L (ref 98–107)
CHOLEST SERPL-MCNC: 165 MG/DL (ref 0–200)
CO2 SERPL-SCNC: 25.6 MMOL/L (ref 22–29)
CREAT BLD-MCNC: 1.39 MG/DL (ref 0.76–1.27)
GFR SERPL CREATININE-BSD FRML MDRD: 53 ML/MIN/1.73
GLOBULIN UR ELPH-MCNC: 3.6 GM/DL
GLUCOSE BLD-MCNC: 96 MG/DL (ref 65–99)
HDLC SERPL-MCNC: 30 MG/DL (ref 40–60)
LDLC SERPL CALC-MCNC: 90 MG/DL (ref 0–100)
LDLC/HDLC SERPL: 3.01 {RATIO}
POTASSIUM BLD-SCNC: 4.7 MMOL/L (ref 3.5–5.2)
PROT SERPL-MCNC: 8.1 G/DL (ref 6–8.5)
SODIUM BLD-SCNC: 141 MMOL/L (ref 136–145)
TRIGL SERPL-MCNC: 224 MG/DL (ref 0–150)
VLDLC SERPL-MCNC: 44.8 MG/DL (ref 5–40)

## 2019-11-25 PROCEDURE — 80061 LIPID PANEL: CPT | Performed by: INTERNAL MEDICINE

## 2019-11-25 PROCEDURE — 99214 OFFICE O/P EST MOD 30 MIN: CPT | Performed by: INTERNAL MEDICINE

## 2019-11-25 PROCEDURE — 93000 ELECTROCARDIOGRAM COMPLETE: CPT | Performed by: INTERNAL MEDICINE

## 2019-11-25 PROCEDURE — 71046 X-RAY EXAM CHEST 2 VIEWS: CPT | Performed by: INTERNAL MEDICINE

## 2019-11-25 PROCEDURE — 80053 COMPREHEN METABOLIC PANEL: CPT | Performed by: INTERNAL MEDICINE

## 2019-11-25 RX ORDER — METHYLPREDNISOLONE 4 MG/1
TABLET ORAL
Qty: 21 TABLET | Refills: 0 | Status: SHIPPED | OUTPATIENT
Start: 2019-11-25 | End: 2020-12-31

## 2019-11-25 RX ORDER — SILDENAFIL CITRATE 20 MG/1
20 TABLET ORAL 3 TIMES DAILY PRN
COMMUNITY

## 2019-11-25 NOTE — PROGRESS NOTES
Subjective   Vini Jones is a 54 y.o. male.  Patient with recent dyspnea on exertion over the last few weeks time more so when going up steps or any phlegm Ever there is no pressure or heaviness sensation she has just shortness of breath unusual patient also rash on penis thinks this occurred in setting him golfing and use restroom 20s in the rough.  Not sure what brought she might have touched also follow-up on blood pressure which is ranging borderline  Body mass index is 32.9 kg/m².  History of Present Illness   Patient here with several problems including dyspnea on exertion fairly recent nature no chest pain or pressure with that.  Not hear herself wheeze when he attempts or other obvious explanations this occurs only with some activity not other times family history for heart disease in her father.  Is a non-smoker has some borderline blood pressure readings.  Today's is 132/90 no formal diagnosis of high blood pressure did eye exam with eye findings suggesting he has hypertension per eye specialist.  Here for follow-up blood pressure readings as referenced in the flirting at the /90 range.  bp 130-140-/80-90  Family history positive for heart disease and asthma.  Smoker.  No drug allergies.  Papular rash on penis occurred in setting of him golf outing going to the rough he will use restroom they are touching his penis does not have rash elsewhere.  Not where specifically any rash that he has.Review of Systems   Respiratory: Positive for shortness of breath.    Skin: Positive for rash.   All other systems reviewed and are negative.      Objective   Vitals:    11/25/19 1535   BP: 132/90   Pulse: 81   Temp: 98.5 °F (36.9 °C)   SpO2: 97%   Weight: 110 kg (242 lb 9.6 oz)     Physical Exam   Constitutional: He appears well-developed and well-nourished.   HENT:   Head: Normocephalic and atraumatic.   Eyes: Conjunctivae are normal. Pupils are equal, round, and reactive to light.   Neck:   No carotid  bruits.  Left right carotid pulses are 2+.   Cardiovascular: Normal rate, regular rhythm and normal heart sounds.   Left and right radial pulse are 2+.   Pulmonary/Chest: Effort normal and breath sounds normal.   Abdominal: Soft. Bowel sounds are normal.   Neurological: He is alert.   Unremarkable gait and station   Skin: Skin is warm and dry.   Papular penile rash shaft of penis excludes the head.  Parents does suggest contact dermatitis no active lesion was suggest this might be genital herpes we will do blood test looking for same but impression is that of a contact dermatitis undefined.  No rash elsewhere by patient's report   Nursing note and vitals reviewed.      No results found for: INR      ECG 12 Lead  Date/Time: 11/25/2019 3:55 PM  Performed by: Narayan Cano Jr., MD  Authorized by: Narayan Cano Jr., MD   Comparison: compared with previous ECG   Comparison to previous ECG: Comparison EKG from 2/22/2017 without changes noted.  Sinus rhythm with T wave inversion in lead III.  Rhythm: sinus rhythm  T inversion: III    Clinical impression: normal ECG  Comments: Normal sinus rhythm without change from 2/22/2017 T wave inversion in lead III only.  Rate is 78.  DE interval is 166 MS, QRS is 98 MS, QT intervals 339 MS.          Chest x-ray AP and lateral obtained.  No comparison available.  No active parenchymal infiltrate seen.  No bony or soft tissue normalities are noted.  Assessment/Plan     1.  Dyspnea on exertion proceed with stress echo    2.  Elevated blood pressure plan observation for now watch salt intake get several readings away from office call blood pressure readings in 2-3 weeks.    3.  Dermatitis of penile shaft plan Medrol Dosepak call if not well in 7 days time and as needed additionally we are waiting the HSV-2 lab on him    4.  Lipid screening await pending results with further recommendations to follow  To begin baby aspirin 81 g daily if having chest pain or progressive shortness  of breath go to ER.    Much of this encounter note is an electronic transcription/translation of spoken language to printed text.  The electronic translation of spoken language may permit erroneous, or at times, nonsensical words or phrases to be inadvertently transcribed.  Although I have reviewed the note for such errors, some may still exist. If there are questions or for further clarification, please contact me.

## 2019-11-26 LAB — HSV2 IGG SER IA-ACNC: <0.91 INDEX (ref 0–0.9)

## 2019-11-27 DIAGNOSIS — R79.89 CREATININE ELEVATION: Primary | ICD-10-CM

## 2019-12-16 ENCOUNTER — HOSPITAL ENCOUNTER (OUTPATIENT)
Dept: CARDIOLOGY | Facility: HOSPITAL | Age: 54
Discharge: HOME | End: 2019-12-16
Admitting: INTERNAL MEDICINE

## 2019-12-16 VITALS
BODY MASS INDEX: 33.88 KG/M2 | HEART RATE: 110 BPM | DIASTOLIC BLOOD PRESSURE: 90 MMHG | SYSTOLIC BLOOD PRESSURE: 116 MMHG | WEIGHT: 242 LBS | HEIGHT: 71 IN | OXYGEN SATURATION: 98 %

## 2019-12-16 DIAGNOSIS — R06.09 DOE (DYSPNEA ON EXERTION): ICD-10-CM

## 2019-12-16 LAB
AORTIC ROOT ANNULUS: 2.3 CM
ASCENDING AORTA: 3.6 CM
BH CV ECHO MEAS - ACS: 1.8 CM
BH CV ECHO MEAS - AO MAX PG: 4.7 MMHG
BH CV ECHO MEAS - AO V2 MAX: 108.6 CM/SEC
BH CV ECHO MEAS - ASC AORTA: 3.6 CM
BH CV ECHO MEAS - BSA(HAYCOCK): 2.4 M^2
BH CV ECHO MEAS - BSA: 2.3 M^2
BH CV ECHO MEAS - BZI_BMI: 32.8 KILOGRAMS/M^2
BH CV ECHO MEAS - BZI_METRIC_HEIGHT: 182.9 CM
BH CV ECHO MEAS - BZI_METRIC_WEIGHT: 109.8 KG
BH CV ECHO MEAS - EDV(MOD-SP4): 116 ML
BH CV ECHO MEAS - EDV(TEICH): 65.6 ML
BH CV ECHO MEAS - EF(CUBED): 67.6 %
BH CV ECHO MEAS - EF(MOD-BP): 67 %
BH CV ECHO MEAS - EF(MOD-SP4): 66.4 %
BH CV ECHO MEAS - EF(TEICH): 59.8 %
BH CV ECHO MEAS - ESV(MOD-SP4): 39 ML
BH CV ECHO MEAS - ESV(TEICH): 26.4 ML
BH CV ECHO MEAS - FS: 31.3 %
BH CV ECHO MEAS - IVS/LVPW: 0.99
BH CV ECHO MEAS - IVSD: 1.2 CM
BH CV ECHO MEAS - LAT PEAK E' VEL: 6 CM/SEC
BH CV ECHO MEAS - LV DIASTOLIC VOL/BSA (35-75): 50.2 ML/M^2
BH CV ECHO MEAS - LV MASS(C)D: 164.3 GRAMS
BH CV ECHO MEAS - LV MASS(C)DI: 71.1 GRAMS/M^2
BH CV ECHO MEAS - LV SYSTOLIC VOL/BSA (12-30): 16.9 ML/M^2
BH CV ECHO MEAS - LVIDD: 3.9 CM
BH CV ECHO MEAS - LVIDS: 2.7 CM
BH CV ECHO MEAS - LVLD AP4: 8.6 CM
BH CV ECHO MEAS - LVLS AP4: 6.8 CM
BH CV ECHO MEAS - LVPWD: 1.2 CM
BH CV ECHO MEAS - MED PEAK E' VEL: 5 CM/SEC
BH CV ECHO MEAS - MV A DUR: 0.14 SEC
BH CV ECHO MEAS - MV A MAX VEL: 76.6 CM/SEC
BH CV ECHO MEAS - MV DEC SLOPE: 136.2 CM/SEC^2
BH CV ECHO MEAS - MV DEC TIME: 0.31 SEC
BH CV ECHO MEAS - MV E MAX VEL: 42.2 CM/SEC
BH CV ECHO MEAS - MV E/A: 0.55
BH CV ECHO MEAS - MV P1/2T MAX VEL: 43.2 CM/SEC
BH CV ECHO MEAS - MV P1/2T: 92.9 MSEC
BH CV ECHO MEAS - MVA P1/2T LCG: 5.1 CM^2
BH CV ECHO MEAS - MVA(P1/2T): 2.4 CM^2
BH CV ECHO MEAS - PULM A REVS DUR: 0.1 SEC
BH CV ECHO MEAS - PULM A REVS VEL: 31.9 CM/SEC
BH CV ECHO MEAS - PULM DIAS VEL: 39 CM/SEC
BH CV ECHO MEAS - PULM S/D: 1.3
BH CV ECHO MEAS - PULM SYS VEL: 50 CM/SEC
BH CV ECHO MEAS - SI(CUBED): 17.3 ML/M^2
BH CV ECHO MEAS - SI(MOD-SP4): 33.3 ML/M^2
BH CV ECHO MEAS - SI(TEICH): 17 ML/M^2
BH CV ECHO MEAS - SV(CUBED): 39.9 ML
BH CV ECHO MEAS - SV(MOD-SP4): 77 ML
BH CV ECHO MEAS - SV(TEICH): 39.3 ML
BH CV ECHO MEAS - TAPSE (>1.6): 1.9 CM2
BH CV ECHO MEASUREMENTS AVERAGE E/E' RATIO: 7.67
BH CV STRESS BP STAGE 1: NORMAL
BH CV STRESS BP STAGE 2: NORMAL
BH CV STRESS DURATION MIN STAGE 1: 3
BH CV STRESS DURATION MIN STAGE 2: 3
BH CV STRESS DURATION MIN STAGE 3: 1
BH CV STRESS DURATION SEC STAGE 1: 0
BH CV STRESS DURATION SEC STAGE 2: 0
BH CV STRESS DURATION SEC STAGE 3: 0
BH CV STRESS ECHO POST STRESS EJECTION FRACTION EF: 79 %
BH CV STRESS GRADE STAGE 1: 10
BH CV STRESS GRADE STAGE 2: 12
BH CV STRESS GRADE STAGE 3: 14
BH CV STRESS HR STAGE 1: 132
BH CV STRESS HR STAGE 2: 144
BH CV STRESS HR STAGE 3: 158
BH CV STRESS METS STAGE 1: 5
BH CV STRESS METS STAGE 2: 7.5
BH CV STRESS METS STAGE 3: 10
BH CV STRESS PROTOCOL 1: NORMAL
BH CV STRESS SPEED STAGE 1: 1.7
BH CV STRESS SPEED STAGE 2: 2.5
BH CV STRESS SPEED STAGE 3: 3.4
BH CV STRESS STAGE 1: 1
BH CV STRESS STAGE 2: 2
BH CV STRESS STAGE 3: 3
BH CV XLRA - RV BASE: 3 CM
BH CV XLRA - RV LENGTH: 7.2 CM
BH CV XLRA - RV MID: 3 CM
BH CV XLRA - TDI S': 13 CM/SEC
LEFT ATRIUM VOLUME INDEX: 16 ML/M2
MAXIMAL PREDICTED HEART RATE: 164 BPM
PERCENT MAX PREDICTED HR: 96.34 %
SINUS: 3.1 CM
STJ: 3.4 CM
STRESS BASELINE BP: NORMAL MMHG
STRESS BASELINE HR: 110 BPM
STRESS O2 SAT REST: 98 %
STRESS PERCENT HR: 113 %
STRESS POST ESTIMATED WORKLOAD: 8 METS
STRESS POST EXERCISE DUR MIN: 7 MIN
STRESS POST EXERCISE DUR SEC: 0 SEC
STRESS POST PEAK BP: NORMAL MMHG
STRESS POST PEAK HR: 158 BPM
STRESS TARGET HR: 139 BPM

## 2019-12-16 PROCEDURE — 93325 DOPPLER ECHO COLOR FLOW MAPG: CPT | Performed by: INTERNAL MEDICINE

## 2019-12-16 PROCEDURE — 93352 ADMIN ECG CONTRAST AGENT: CPT | Performed by: INTERNAL MEDICINE

## 2019-12-16 PROCEDURE — 93320 DOPPLER ECHO COMPLETE: CPT

## 2019-12-16 PROCEDURE — 93350 STRESS TTE ONLY: CPT

## 2019-12-16 PROCEDURE — 93320 DOPPLER ECHO COMPLETE: CPT | Performed by: INTERNAL MEDICINE

## 2019-12-16 PROCEDURE — 93016 CV STRESS TEST SUPVJ ONLY: CPT | Performed by: INTERNAL MEDICINE

## 2019-12-16 PROCEDURE — 93325 DOPPLER ECHO COLOR FLOW MAPG: CPT

## 2019-12-16 PROCEDURE — 25010000002 PERFLUTREN (DEFINITY) 8.476 MG IN SODIUM CHLORIDE 0.9 % 10 ML INJECTION: Performed by: INTERNAL MEDICINE

## 2019-12-16 PROCEDURE — 93018 CV STRESS TEST I&R ONLY: CPT | Performed by: INTERNAL MEDICINE

## 2019-12-16 PROCEDURE — 93017 CV STRESS TEST TRACING ONLY: CPT

## 2019-12-16 PROCEDURE — 93350 STRESS TTE ONLY: CPT | Performed by: INTERNAL MEDICINE

## 2019-12-16 RX ADMIN — PERFLUTREN 3 ML: 6.52 INJECTION, SUSPENSION INTRAVENOUS at 09:44

## 2019-12-19 DIAGNOSIS — R06.09 DOE (DYSPNEA ON EXERTION): Primary | ICD-10-CM

## 2020-03-03 NOTE — TELEPHONE ENCOUNTER
Get updated Sg MIRZA see why he takes this    See where we are with urine drug screening and contract.

## 2020-03-03 NOTE — TELEPHONE ENCOUNTER
PT CALLED REQUESTING A REFILL FOR traMADol (ULTRAM) 50 MG tablet     [facility] CONFIRMED     PT CALL BACK   [phone]

## 2020-03-05 RX ORDER — TRAMADOL HYDROCHLORIDE 50 MG/1
50 TABLET ORAL EVERY 6 HOURS PRN
Qty: 30 TABLET | Refills: 0 | Status: SHIPPED | OUTPATIENT
Start: 2020-03-05 | End: 2020-06-03 | Stop reason: SDUPTHER

## 2020-06-08 RX ORDER — TRAMADOL HYDROCHLORIDE 50 MG/1
50 TABLET ORAL EVERY 6 HOURS PRN
Qty: 90 TABLET | Refills: 0 | Status: SHIPPED | OUTPATIENT
Start: 2020-06-08 | End: 2020-11-16

## 2020-06-08 NOTE — TELEPHONE ENCOUNTER
Did refill and inform patient he will need to come in for urine drug screen [facility] drug contract in August as well as visit  
Get updated Sg first also see if this is for back versus other problem.  See what pharmacy he wants that routed to  
PT INFORMED   
Sg on your desk.kathy  
Initial (On Arrival)

## 2020-08-10 ENCOUNTER — OFFICE VISIT (OUTPATIENT)
Dept: FAMILY MEDICINE CLINIC | Facility: CLINIC | Age: 55
End: 2020-08-10

## 2020-08-10 VITALS
HEART RATE: 72 BPM | DIASTOLIC BLOOD PRESSURE: 68 MMHG | TEMPERATURE: 98 F | WEIGHT: 240.4 LBS | BODY MASS INDEX: 33.65 KG/M2 | HEIGHT: 71 IN | OXYGEN SATURATION: 98 % | SYSTOLIC BLOOD PRESSURE: 124 MMHG

## 2020-08-10 DIAGNOSIS — G89.29 CHRONIC MIDLINE LOW BACK PAIN WITHOUT SCIATICA: Primary | ICD-10-CM

## 2020-08-10 DIAGNOSIS — M54.50 CHRONIC MIDLINE LOW BACK PAIN WITHOUT SCIATICA: Primary | ICD-10-CM

## 2020-08-10 DIAGNOSIS — Z51.81 MEDICATION MONITORING ENCOUNTER: ICD-10-CM

## 2020-08-10 PROCEDURE — 99213 OFFICE O/P EST LOW 20 MIN: CPT | Performed by: INTERNAL MEDICINE

## 2020-08-10 RX ORDER — TRAMADOL HYDROCHLORIDE 50 MG/1
50 TABLET ORAL EVERY 6 HOURS PRN
Qty: 60 TABLET | Refills: 0 | Status: SHIPPED | OUTPATIENT
Start: 2020-08-10 | End: 2021-06-30 | Stop reason: HOSPADM

## 2020-08-10 NOTE — PROGRESS NOTES
Subjective Chronic back pain worse when he plays golf cannot take NSAIDs for this requesting refill on tramadol.  Vini Jones is a 55 y.o. male. Body mass index is 33.53 kg/m².  History of Present Illness   Patient followed by nephrology has been advised several times to avoid NSAIDs does state is more so when he plays golf than other times.  Requests refill on tramadol which he takes predominately in this setting of playing golf.  Need updated drug contract and urine drug screen Sg on this.  Overall is doing okay    Review of Systems   Constitutional: Negative.    HENT: Negative.    Eyes: Negative.    Respiratory: Negative.    Cardiovascular: Negative.    Gastrointestinal: Negative.    Endocrine: Negative.    Genitourinary: Negative.    Musculoskeletal: Positive for back pain.   Skin: Negative.    Allergic/Immunologic: Negative.    Neurological: Negative.    Hematological: Negative.    Psychiatric/Behavioral: Negative.        Objective   Vitals:    08/10/20 0856   BP: 124/68   Pulse: 72   Temp: 98 °F (36.7 °C)   SpO2: 98%   Weight: 109 kg (240 lb 6.4 oz)     Physical Exam   Constitutional: He appears well-developed and well-nourished.   HENT:   Head: Normocephalic and atraumatic.   Eyes: Pupils are equal, round, and reactive to light. Conjunctivae are normal.   Cardiovascular: Normal rate, regular rhythm and normal heart sounds.   Pulmonary/Chest: Effort normal and breath sounds normal.   Musculoskeletal:   No discomfort over back with palpation today   Nursing note and vitals reviewed.    No results found for: INR    Procedures    Assessment/Plan   Chronic lumbar pain    Medication monitoring with updated UDS per contract and Sg for tramadol.  Quantity 60 be the amount he received today.    Much of this encounter note is an electronic transcription/translation of spoken language to printed text.  The electronic translation of spoken language may permit erroneous, or at times, nonsensical words or  phrases to be inadvertently transcribed.  Although I have reviewed the note for such errors, some may still exist. If there are questions or for further clarification, please contact me.

## 2020-08-13 LAB — CONV REPORT SUMMARY: NORMAL

## 2020-11-16 RX ORDER — TRAMADOL HYDROCHLORIDE 50 MG/1
TABLET ORAL
Qty: 90 TABLET | Refills: 0 | Status: SHIPPED | OUTPATIENT
Start: 2020-11-16 | End: 2021-04-22 | Stop reason: SDUPTHER

## 2020-12-31 ENCOUNTER — OFFICE VISIT (OUTPATIENT)
Dept: FAMILY MEDICINE CLINIC | Facility: CLINIC | Age: 55
End: 2020-12-31

## 2020-12-31 VITALS
HEART RATE: 84 BPM | TEMPERATURE: 97.6 F | WEIGHT: 244.6 LBS | OXYGEN SATURATION: 97 % | HEIGHT: 71 IN | SYSTOLIC BLOOD PRESSURE: 118 MMHG | DIASTOLIC BLOOD PRESSURE: 72 MMHG | BODY MASS INDEX: 34.24 KG/M2

## 2020-12-31 DIAGNOSIS — E78.5 ELEVATED LIPIDS: ICD-10-CM

## 2020-12-31 DIAGNOSIS — R79.89 ELEVATED SERUM CREATININE: ICD-10-CM

## 2020-12-31 DIAGNOSIS — M54.42 ACUTE LEFT-SIDED LOW BACK PAIN WITH LEFT-SIDED SCIATICA: Primary | ICD-10-CM

## 2020-12-31 DIAGNOSIS — I10 HYPERTENSION, ESSENTIAL: ICD-10-CM

## 2020-12-31 DIAGNOSIS — Z51.81 MEDICATION MONITORING ENCOUNTER: ICD-10-CM

## 2020-12-31 DIAGNOSIS — L98.9 SKIN LESION OF CHEST WALL: ICD-10-CM

## 2020-12-31 DIAGNOSIS — M25.561 RIGHT KNEE PAIN, UNSPECIFIED CHRONICITY: ICD-10-CM

## 2020-12-31 LAB
ALBUMIN SERPL-MCNC: 4.4 G/DL (ref 3.5–5.2)
ALBUMIN/GLOB SERPL: 1.5 G/DL
ALP SERPL-CCNC: 40 U/L (ref 39–117)
ALT SERPL W P-5'-P-CCNC: 21 U/L (ref 1–41)
ANION GAP SERPL CALCULATED.3IONS-SCNC: 8.5 MMOL/L (ref 5–15)
AST SERPL-CCNC: 21 U/L (ref 1–40)
BILIRUB SERPL-MCNC: 0.5 MG/DL (ref 0–1.2)
BUN SERPL-MCNC: 19 MG/DL (ref 6–20)
BUN/CREAT SERPL: 16.8 (ref 7–25)
CALCIUM SPEC-SCNC: 9.3 MG/DL (ref 8.6–10.5)
CHLORIDE SERPL-SCNC: 103 MMOL/L (ref 98–107)
CHOLEST SERPL-MCNC: 170 MG/DL (ref 0–200)
CO2 SERPL-SCNC: 26.5 MMOL/L (ref 22–29)
CREAT SERPL-MCNC: 1.13 MG/DL (ref 0.76–1.27)
ERYTHROCYTE [DISTWIDTH] IN BLOOD BY AUTOMATED COUNT: 13.3 % (ref 12.3–15.4)
GFR SERPL CREATININE-BSD FRML MDRD: 67 ML/MIN/1.73
GLOBULIN UR ELPH-MCNC: 3 GM/DL
GLUCOSE SERPL-MCNC: 92 MG/DL (ref 65–99)
HCT VFR BLD AUTO: 49.7 % (ref 37.5–51)
HDLC SERPL-MCNC: 35 MG/DL (ref 40–60)
HGB BLD-MCNC: 16.6 G/DL (ref 13–17.7)
LDLC SERPL CALC-MCNC: 114 MG/DL (ref 0–100)
LDLC/HDLC SERPL: 3.19 {RATIO}
LYMPHOCYTES # BLD AUTO: 1.8 10*3/MM3 (ref 0.7–3.1)
LYMPHOCYTES NFR BLD AUTO: 30.2 % (ref 19.6–45.3)
MCH RBC QN AUTO: 30.2 PG (ref 26.6–33)
MCHC RBC AUTO-ENTMCNC: 33.3 G/DL (ref 31.5–35.7)
MCV RBC AUTO: 90.5 FL (ref 79–97)
MONOCYTES # BLD AUTO: 0.4 10*3/MM3 (ref 0.1–0.9)
MONOCYTES NFR BLD AUTO: 7.3 % (ref 5–12)
NEUTROPHILS NFR BLD AUTO: 3.8 10*3/MM3 (ref 1.7–7)
NEUTROPHILS NFR BLD AUTO: 62.5 % (ref 42.7–76)
PLATELET # BLD AUTO: 267 10*3/MM3 (ref 140–450)
PMV BLD AUTO: 7.1 FL (ref 6–12)
POTASSIUM SERPL-SCNC: 4.8 MMOL/L (ref 3.5–5.2)
PROT SERPL-MCNC: 7.4 G/DL (ref 6–8.5)
RBC # BLD AUTO: 5.5 10*6/MM3 (ref 4.14–5.8)
SODIUM SERPL-SCNC: 138 MMOL/L (ref 136–145)
TRIGL SERPL-MCNC: 116 MG/DL (ref 0–150)
VLDLC SERPL-MCNC: 21 MG/DL (ref 5–40)
WBC # BLD AUTO: 6 10*3/MM3 (ref 3.4–10.8)

## 2020-12-31 PROCEDURE — 72100 X-RAY EXAM L-S SPINE 2/3 VWS: CPT | Performed by: INTERNAL MEDICINE

## 2020-12-31 PROCEDURE — 80061 LIPID PANEL: CPT | Performed by: INTERNAL MEDICINE

## 2020-12-31 PROCEDURE — 85025 COMPLETE CBC W/AUTO DIFF WBC: CPT | Performed by: INTERNAL MEDICINE

## 2020-12-31 PROCEDURE — 73560 X-RAY EXAM OF KNEE 1 OR 2: CPT | Performed by: INTERNAL MEDICINE

## 2020-12-31 PROCEDURE — 99214 OFFICE O/P EST MOD 30 MIN: CPT | Performed by: INTERNAL MEDICINE

## 2020-12-31 PROCEDURE — 80053 COMPREHEN METABOLIC PANEL: CPT | Performed by: INTERNAL MEDICINE

## 2020-12-31 PROCEDURE — 36415 COLL VENOUS BLD VENIPUNCTURE: CPT | Performed by: INTERNAL MEDICINE

## 2020-12-31 RX ORDER — DOXYCYCLINE 100 MG/1
100 CAPSULE ORAL 2 TIMES DAILY
COMMUNITY
Start: 2020-12-28 | End: 2021-04-22

## 2020-12-31 RX ORDER — FAMOTIDINE 20 MG/1
TABLET, FILM COATED ORAL
COMMUNITY
Start: 2020-11-09 | End: 2022-01-27 | Stop reason: SDUPTHER

## 2020-12-31 RX ORDER — AMLODIPINE BESYLATE 2.5 MG/1
2.5 TABLET ORAL EVERY EVENING
COMMUNITY
Start: 2020-11-09 | End: 2022-01-27 | Stop reason: SDUPTHER

## 2020-12-31 NOTE — PROGRESS NOTES
Subjective   Vini Jones is a 55 y.o. male.  Patient here for several reasons increased pain lower back recently seem to flare in setting of playing golf has not backed off since that time he has had no mid back trouble is having some low-grade sciatica in the left leg which generally does not occur also cause some ongoing right knee pain but no locking giving he is follow-up on blood pressure lipids has lesion on chest wall as well.  Body mass index is 34.11 kg/m².  History of Present Illness multiple reasons for visit including lower back pain of several weeks duration not responding to relative rest began in the setting playing golf does have low-grade static in the left leg no other specific injuries ongoing discomfort with movement is unable to play golf now which clearly flares the pain patient does have some right knee discomfort also last several months no specific injury no locking giving this ongoing discomfort with walking.  No known prior knee trouble has follow-up skin lesion on his chest which she wishes to have checked out this needs follow-up on his lipids he is recheck on creatinine which has been elevated does need updated medication monitoring for his tramadol which he takes for back primarily has had episodes when he was having sciatica with referral into the left testicle also  Back worse  2-3 wks  Began afrter golfing some sciatica  MS positive heart disease and asthma.  Non-smoker.  Drug allergies are none  Review of Systems   Constitutional: Negative.    HENT: Negative.    Eyes: Negative.    Respiratory: Negative.    Cardiovascular: Negative.    Gastrointestinal: Negative.    Endocrine: Negative.    Genitourinary: Negative.    Musculoskeletal: Positive for back pain.   Skin: Negative.    Allergic/Immunologic: Negative.    Neurological: Negative.    Hematological: Negative.    Psychiatric/Behavioral: Negative.        Objective   Vitals:    12/31/20 1003   BP: 118/72   Pulse: 84   Temp:  97.6 °F (36.4 °C)   SpO2: 97%   Weight: 111 kg (244 lb 9.6 oz)     Physical Exam  Vitals signs and nursing note reviewed.   Constitutional:       Appearance: Normal appearance.   HENT:      Head: Normocephalic and atraumatic.   Eyes:      Conjunctiva/sclera: Conjunctivae normal.      Pupils: Pupils are equal, round, and reactive to light.   Cardiovascular:      Rate and Rhythm: Normal rate and regular rhythm.      Heart sounds: Normal heart sounds.   Pulmonary:      Effort: Pulmonary effort is normal.      Breath sounds: Normal breath sounds.   Abdominal:      General: Abdomen is flat. Bowel sounds are normal.      Palpations: Abdomen is soft.   Musculoskeletal:      Comments: Patient mild discomfort lower lumbar spine more to the left.  Does ambulate fairly well    Right knee is normal stressing in AP, lateral, medial motion does have a mild discomfort with internal and external rotation lower leg extension.  No true focal tenderness and no effusion is noted.   Skin:     General: Skin is warm and dry.      Comments: 3 mm raised lesion mid chest site to the right of the mid sternum somewhat pale no visible pore nonfluctuant no increased heat but is increasing in size palpation observation we will get general surgery to look at this see if removal is appropriate   Neurological:      General: No focal deficit present.      Mental Status: He is alert.      Comments: Knee jerks 1+ bilaterally, negative straight leg raises bilaterally         No results found for: INR    Procedures  LS-spine x-ray PA and lateral views obtained.  Does have moderate narrowing at L5-S1 and L4-L5.  No other bony or soft tissue maladies are noted.  No comparison available.    X-ray right knee 2 views obtained.  No comparison available.  Does have mild narrowing of the medial meniscus otherwise unremarkable.  Assessment/Plan   1.  Acute left-sided lumbar pain history of intermittent back trouble this is flared in setting of golf but does not  respond to relative rest like and has a starkly plan MRI of the lumbar spine at [facility] does request open MRI due to claustrophobia    2.  Right knee pain plan refer to orthopedic surgeon    3.  Skin lesion mid chest around sternum we will send him to Dr. Lidia Greene her group he is using it route before for what appears to be a groin lesion do not see any overt poor aside from the increasing in size no other worrisome features nonetheless we will get general surgery to evaluate    4.  Elevated lipids get updated values    5.  Elevated creatinine up a value on that    6 .  Medication monitoring for all TRAM with updated urine drug screen.  There are no diagnoses linked to this encounter.     7.  Hypertension controlled continue present medicine recheck in 6-12 months   Much of this encounter note is an electronic transcription/translation of spoken language to printed text.  The electronic translation of spoken language may permit erroneous, or at times, nonsensical words or phrases to be inadvertently transcribed.  Although I have reviewed the note for such errors, some may still exist. If there are questions or for further clarification, please contact me.

## 2021-01-01 DIAGNOSIS — E78.00 ELEVATED LDL CHOLESTEROL LEVEL: Primary | ICD-10-CM

## 2021-01-19 ENCOUNTER — OFFICE VISIT (OUTPATIENT)
Dept: SURGERY | Facility: CLINIC | Age: 56
End: 2021-01-19

## 2021-01-19 VITALS — HEIGHT: 72 IN | WEIGHT: 245.4 LBS | BODY MASS INDEX: 33.24 KG/M2

## 2021-01-19 DIAGNOSIS — L72.3 SEBACEOUS CYST: Primary | ICD-10-CM

## 2021-01-19 PROCEDURE — 11400 EXC TR-EXT B9+MARG 0.5 CM<: CPT | Performed by: SURGERY

## 2021-01-19 PROCEDURE — 99242 OFF/OP CONSLTJ NEW/EST SF 20: CPT | Performed by: SURGERY

## 2021-01-19 PROCEDURE — 88304 TISSUE EXAM BY PATHOLOGIST: CPT | Performed by: SURGERY

## 2021-01-19 RX ORDER — ASPIRIN 81 MG/1
81 TABLET, CHEWABLE ORAL DAILY
COMMUNITY

## 2021-01-19 NOTE — PROGRESS NOTES
Subjective   Vini Jones is a 55 y.o. male who presents to the office in surgical consultation from Henrry Guajardo MD for a cyst of the anterior chest wall.    History of Present Illness     The patient has a cyst of the anterior chest wall that has been present for several years.  It is slowly getting larger and intermittently symptomatic, especially when pressure is applied.  There has never been any trauma to the area.  There has never been any drainage.    Review of Systems   Constitutional: Negative for fatigue and fever.   Respiratory: Negative for chest tightness and shortness of breath.    Cardiovascular: Negative for chest pain and palpitations.   Gastrointestinal: Negative for abdominal pain, blood in stool, constipation, diarrhea, nausea and vomiting.     Past Medical History:   Diagnosis Date   • Abdominal pain    • Enlarged prostate    • GERD (gastroesophageal reflux disease)    • Hypertension    • Sleep apnea     C-Pap     Past Surgical History:   Procedure Laterality Date   • APPENDECTOMY     • BIOPSY PROSTATE NEEDLE / PUNCH / INCISIONAL     • CHOLECYSTECTOMY     • COLONOSCOPY       Family History   Problem Relation Age of Onset   • Heart disease Father    • Asthma Father    • Heart attack Father      Social History     Socioeconomic History   • Marital status:      Spouse name: Not on file   • Number of children: Not on file   • Years of education: Not on file   • Highest education level: Not on file   Tobacco Use   • Smoking status: Never Smoker   • Smokeless tobacco: Never Used   Substance and Sexual Activity   • Alcohol use: No   • Drug use: No   • Sexual activity: Defer       Objective   Physical Exam  Constitutional:       Appearance: Normal appearance. He is well-developed. He is not toxic-appearing.   Eyes:      General: No scleral icterus.  Pulmonary:      Effort: Pulmonary effort is normal. No respiratory distress.   Skin:     General: Skin is warm and dry.      Comments:  There is a 1 cm sebaceous cyst of the central chest wall with mild overlying skin distortion from protruding sebum.  There is no surrounding erythema.  There is no palpable fluctuance.   Neurological:      Mental Status: He is alert and oriented to person, place, and time.   Psychiatric:         Behavior: Behavior normal.         Thought Content: Thought content normal.         Judgment: Judgment normal.     Procedure  The central chest wall was prepped and draped in standard surgical fashion.  The area of the cyst was infiltrated with 1% lidocaine without epinephrine.  An elliptical incision was made around the cyst with a scalpel and carried through the skin into the subcutaneous tissue.  It was removed sharply with a scalpel.  There were 2 subcutaneous bleeders that were controlled with 4-0 Vicryl suture in a figure-of-eight fashion.  This left only mild oozing.  The skin was then closed with a 4-0 Vicryl in a subcuticular fashion.  Mastisol and Steri-Strips were placed.  A dressing was placed.  The patient tolerated procedure well.    Assessment/Plan       The encounter diagnosis was Sebaceous cyst.    The patient has a sebaceous cyst of the chest wall.  An excision of the sebaceous cyst was performed in the office.  Local care was discussed with him.  He will follow-up on an as-needed basis.

## 2021-01-23 LAB
LAB AP CASE REPORT: NORMAL
PATH REPORT.FINAL DX SPEC: NORMAL
PATH REPORT.GROSS SPEC: NORMAL

## 2021-03-30 ENCOUNTER — BULK ORDERING (OUTPATIENT)
Dept: CASE MANAGEMENT | Facility: OTHER | Age: 56
End: 2021-03-30

## 2021-03-30 DIAGNOSIS — Z23 IMMUNIZATION DUE: ICD-10-CM

## 2021-04-22 ENCOUNTER — OFFICE VISIT (OUTPATIENT)
Dept: FAMILY MEDICINE CLINIC | Facility: CLINIC | Age: 56
End: 2021-04-22

## 2021-04-22 VITALS
BODY MASS INDEX: 32.78 KG/M2 | HEART RATE: 69 BPM | DIASTOLIC BLOOD PRESSURE: 78 MMHG | TEMPERATURE: 97.5 F | HEIGHT: 72 IN | WEIGHT: 242 LBS | OXYGEN SATURATION: 99 % | SYSTOLIC BLOOD PRESSURE: 112 MMHG

## 2021-04-22 DIAGNOSIS — G89.29 CHRONIC BILATERAL LOW BACK PAIN WITHOUT SCIATICA: ICD-10-CM

## 2021-04-22 DIAGNOSIS — M51.36 BULGING LUMBAR DISC: ICD-10-CM

## 2021-04-22 DIAGNOSIS — N18.2 STAGE 2 CHRONIC KIDNEY DISEASE: ICD-10-CM

## 2021-04-22 DIAGNOSIS — Z79.899 LONG-TERM USE OF HIGH-RISK MEDICATION: ICD-10-CM

## 2021-04-22 DIAGNOSIS — I10 ESSENTIAL HYPERTENSION: ICD-10-CM

## 2021-04-22 DIAGNOSIS — Z00.8 ENCOUNTER FOR BIOMETRIC SCREENING: ICD-10-CM

## 2021-04-22 DIAGNOSIS — Z51.81 THERAPEUTIC DRUG MONITORING: ICD-10-CM

## 2021-04-22 DIAGNOSIS — M54.50 CHRONIC BILATERAL LOW BACK PAIN WITHOUT SCIATICA: ICD-10-CM

## 2021-04-22 DIAGNOSIS — L30.9 DERMATITIS: Primary | ICD-10-CM

## 2021-04-22 LAB
ALBUMIN SERPL-MCNC: 4.2 G/DL (ref 3.5–5.2)
ALBUMIN/GLOB SERPL: 1.3 G/DL
ALP SERPL-CCNC: 36 U/L (ref 39–117)
ALT SERPL W P-5'-P-CCNC: 15 U/L (ref 1–41)
ANION GAP SERPL CALCULATED.3IONS-SCNC: 7 MMOL/L (ref 5–15)
AST SERPL-CCNC: 13 U/L (ref 1–40)
BASOPHILS # BLD AUTO: 0.09 10*3/MM3 (ref 0–0.2)
BASOPHILS NFR BLD AUTO: 0.9 % (ref 0–1.5)
BILIRUB SERPL-MCNC: 0.5 MG/DL (ref 0–1.2)
BUN SERPL-MCNC: 26 MG/DL (ref 6–20)
BUN/CREAT SERPL: 20 (ref 7–25)
CALCIUM SPEC-SCNC: 9.2 MG/DL (ref 8.6–10.5)
CHLORIDE SERPL-SCNC: 101 MMOL/L (ref 98–107)
CHOLEST SERPL-MCNC: 172 MG/DL (ref 0–200)
CO2 SERPL-SCNC: 29 MMOL/L (ref 22–29)
CREAT SERPL-MCNC: 1.3 MG/DL (ref 0.76–1.27)
DEPRECATED RDW RBC AUTO: 42 FL (ref 37–54)
EOSINOPHIL # BLD AUTO: 0.12 10*3/MM3 (ref 0–0.4)
EOSINOPHIL NFR BLD AUTO: 1.3 % (ref 0.3–6.2)
ERYTHROCYTE [DISTWIDTH] IN BLOOD BY AUTOMATED COUNT: 13.2 % (ref 12.3–15.4)
GFR SERPL CREATININE-BSD FRML MDRD: 57 ML/MIN/1.73
GLOBULIN UR ELPH-MCNC: 3.2 GM/DL
GLUCOSE SERPL-MCNC: 88 MG/DL (ref 65–99)
HCT VFR BLD AUTO: 53.2 % (ref 37.5–51)
HDLC SERPL-MCNC: 41 MG/DL (ref 40–60)
HGB BLD-MCNC: 17.5 G/DL (ref 13–17.7)
IMM GRANULOCYTES # BLD AUTO: 0.13 10*3/MM3 (ref 0–0.05)
IMM GRANULOCYTES NFR BLD AUTO: 1.4 % (ref 0–0.5)
LDLC SERPL CALC-MCNC: 103 MG/DL (ref 0–100)
LDLC/HDLC SERPL: 2.42 {RATIO}
LYMPHOCYTES # BLD AUTO: 2.47 10*3/MM3 (ref 0.7–3.1)
LYMPHOCYTES NFR BLD AUTO: 26 % (ref 19.6–45.3)
MCH RBC QN AUTO: 28.7 PG (ref 26.6–33)
MCHC RBC AUTO-ENTMCNC: 32.9 G/DL (ref 31.5–35.7)
MCV RBC AUTO: 87.4 FL (ref 79–97)
MONOCYTES # BLD AUTO: 0.93 10*3/MM3 (ref 0.1–0.9)
MONOCYTES NFR BLD AUTO: 9.8 % (ref 5–12)
NEUTROPHILS NFR BLD AUTO: 5.76 10*3/MM3 (ref 1.7–7)
NEUTROPHILS NFR BLD AUTO: 60.6 % (ref 42.7–76)
NRBC BLD AUTO-RTO: 0 /100 WBC (ref 0–0.2)
PLATELET # BLD AUTO: 305 10*3/MM3 (ref 140–450)
PMV BLD AUTO: 9.3 FL (ref 6–12)
POTASSIUM SERPL-SCNC: 4.3 MMOL/L (ref 3.5–5.2)
PROT SERPL-MCNC: 7.4 G/DL (ref 6–8.5)
RBC # BLD AUTO: 6.09 10*6/MM3 (ref 4.14–5.8)
SODIUM SERPL-SCNC: 137 MMOL/L (ref 136–145)
TRIGL SERPL-MCNC: 159 MG/DL (ref 0–150)
TSH SERPL DL<=0.05 MIU/L-ACNC: 2.28 UIU/ML (ref 0.27–4.2)
VLDLC SERPL-MCNC: 28 MG/DL (ref 5–40)
WBC # BLD AUTO: 9.5 10*3/MM3 (ref 3.4–10.8)

## 2021-04-22 PROCEDURE — 99214 OFFICE O/P EST MOD 30 MIN: CPT | Performed by: NURSE PRACTITIONER

## 2021-04-22 PROCEDURE — 84443 ASSAY THYROID STIM HORMONE: CPT | Performed by: NURSE PRACTITIONER

## 2021-04-22 PROCEDURE — 85025 COMPLETE CBC W/AUTO DIFF WBC: CPT | Performed by: NURSE PRACTITIONER

## 2021-04-22 PROCEDURE — 80061 LIPID PANEL: CPT | Performed by: NURSE PRACTITIONER

## 2021-04-22 PROCEDURE — 80053 COMPREHEN METABOLIC PANEL: CPT | Performed by: NURSE PRACTITIONER

## 2021-04-22 RX ORDER — TRIAMCINOLONE ACETONIDE 1 MG/G
CREAM TOPICAL 2 TIMES DAILY
Qty: 15 G | Refills: 1 | Status: SHIPPED | OUTPATIENT
Start: 2021-04-22 | End: 2021-06-22 | Stop reason: SDUPTHER

## 2021-04-22 NOTE — PROGRESS NOTES
"Chief Complaint  Biometric Screening and Rash (getting better now/ on back)    Subjective          Vini Jones presents to [facility] PR[facility]  History of Present Illness   Prev PCP Dr Cano retired new to me today  C/o R sided back rash redness and itching x 2.5 weeks unsure if bitten went to [facility] 04/12/21 tx cellulitis keflex 500 mg QID returned 04/15/21 cont itching and tx medrol dose pack and has pretty much resolved, golf and hiking unsure source if insect bite but now with mild rash, no fatigue fevers or joint pain  With chronic LBP was prev seeing Dr Cano on tramadol prn for physical activity #28 lasts approx 2 mo, with CKD2 sees nephrology Dr Zazueta with chronic HTN on amlodipine 2.5 mg and ASA 81 mg no CP dizziness HA LE edema, last xray lumbar 12/20 BTI no radiology overread  Sees Dr Pearce urology on axiron and revatio, rapaflo  Sees Dr Ibrahim GI 10/19 colonoscopy removed polyps FU 3 years  Request biometric screening form completed fasting for labs    Objective   Vital Signs:   /78 (BP Location: Left arm, Patient Position: Sitting, Cuff Size: Large Adult)   Pulse 69   Temp 97.5 °F (36.4 °C) (Infrared)   Ht 182.9 cm (72.01\")   Wt 110 kg (242 lb)   SpO2 99%   BMI 32.81 kg/m²     Physical Exam  Vitals reviewed.   Constitutional:       Appearance: He is well-developed.   HENT:      Head: Normocephalic and atraumatic.   Eyes:      Conjunctiva/sclera: Conjunctivae normal.      Pupils: Pupils are equal, round, and reactive to light.   Cardiovascular:      Rate and Rhythm: Normal rate and regular rhythm.      Pulses: Normal pulses.      Heart sounds: Normal heart sounds.   Pulmonary:      Effort: Pulmonary effort is normal.      Breath sounds: Normal breath sounds.   Abdominal:      General: There is no distension.      Palpations: Abdomen is soft. There is no mass.      Tenderness: There is no abdominal tenderness. There is no right CVA tenderness, left CVA " tenderness, guarding or rebound.      Hernia: No hernia is present.   Musculoskeletal:         General: Normal range of motion.      Cervical back: Normal range of motion.      Right lower leg: No edema.      Left lower leg: No edema.   Skin:     General: Skin is warm and dry.      Findings: Erythema (patch R mid back with excoriation no dc or bleeding no satellite lesions) present.   Neurological:      Mental Status: He is alert and oriented to person, place, and time.   Psychiatric:         Mood and Affect: Mood normal.         Behavior: Behavior normal.         Thought Content: Thought content normal.         Judgment: Judgment normal.        Result Review :                 Assessment and Plan    Diagnoses and all orders for this visit:    1. Dermatitis (Primary)  -     CBC & Differential  -     Comprehensive Metabolic Panel  -     Lipid Panel  -     TSH  -     Cancel: Urinalysis With Microscopic - Urine, Clean Catch    2. Encounter for biometric screening  -     CBC & Differential  -     Comprehensive Metabolic Panel  -     Lipid Panel    3. Bulging lumbar disc  -     Compliance Drug Analysis, Ur - Urine, Clean Catch  -     CBC & Differential  -     Comprehensive Metabolic Panel  -     Lipid Panel  -     TSH  -     Cancel: Urinalysis With Microscopic - Urine, Clean Catch    4. Chronic bilateral low back pain without sciatica  -     Compliance Drug Analysis, Ur - Urine, Clean Catch    5. Stage 2 chronic kidney disease  -     CBC & Differential  -     Comprehensive Metabolic Panel  -     Cancel: Urinalysis With Microscopic - Urine, Clean Catch    6. Essential hypertension    7. Long-term use of high-risk medication  -     Compliance Drug Analysis, Ur - Urine, Clean Catch    8. Therapeutic drug monitoring  -     Compliance Drug Analysis, Ur - Urine, Clean Catch    Other orders  -     triamcinolone (KENALOG) 0.1 % cream; Apply  topically to the appropriate area as directed 2 (Two) Times a Day.  Dispense: 15 g;  Refill: 1        Follow Up   No follow-ups on file.  Patient was given instructions and counseling regarding his condition or for health maintenance advice. Please see specific information pulled into the AVS if appropriate.   Reviewed prev PCP records today, check labs and call with results, cont all chronic dz meds, cont FU with specialists, trial triamcinolone aaa prn and monitor for resolution, check BP at home, UTD PSA and colonoscopy, biometric form completed and faxed, The patient has read and signed the [facility] Controlled Substance Contract UTD today, UDS UTD today, kayla reviewed.  I will continue to see patient for regular follow up appointments.  They are well controlled on their medication.  KAYLA is updated every 3 months. The patient is aware of the potential for addiction and dependence.

## 2021-04-22 NOTE — PATIENT INSTRUCTIONS
Reviewed prev PCP records today, check labs and call with results, cont all chronic dz meds, cont FU with specialists, trial triamcinolone aaa prn and monitor for resolution, check BP at home, UTD PSA and colonoscopy, biometric form completed and faxed, The patient has read and signed the [facility] Controlled Substance Contract UTD today, UDS UTD today, kayla reviewed.  I will continue to see patient for regular follow up appointments.  They are well controlled on their medication.  KAYLA is updated every 3 months. The patient is aware of the potential for addiction and dependence.

## 2021-04-30 LAB — DRUGS UR: NORMAL

## 2021-05-10 ENCOUNTER — LAB (OUTPATIENT)
Dept: FAMILY MEDICINE CLINIC | Facility: CLINIC | Age: 56
End: 2021-05-10

## 2021-05-10 DIAGNOSIS — Z00.8 ENCOUNTER FOR BIOMETRIC SCREENING: Primary | ICD-10-CM

## 2021-05-10 DIAGNOSIS — M54.50 CHRONIC BILATERAL LOW BACK PAIN WITHOUT SCIATICA: ICD-10-CM

## 2021-05-10 DIAGNOSIS — G89.29 CHRONIC BILATERAL LOW BACK PAIN WITHOUT SCIATICA: ICD-10-CM

## 2021-05-10 LAB
BACTERIA UR QL AUTO: ABNORMAL /HPF
BILIRUB UR QL STRIP: NEGATIVE
CLARITY UR: CLEAR
COLOR UR: YELLOW
GLUCOSE UR STRIP-MCNC: NEGATIVE MG/DL
HGB UR QL STRIP.AUTO: ABNORMAL
KETONES UR QL STRIP: NEGATIVE
LEUKOCYTE ESTERASE UR QL STRIP.AUTO: NEGATIVE
NITRITE UR QL STRIP: NEGATIVE
PH UR STRIP.AUTO: 6.5 [PH] (ref 4.6–8)
PROT UR QL STRIP: NEGATIVE
RBC # UR: ABNORMAL /HPF
REF LAB TEST METHOD: ABNORMAL
SP GR UR STRIP: 1.02 (ref 1–1.03)
SQUAMOUS #/AREA URNS HPF: ABNORMAL /HPF
UROBILINOGEN UR QL STRIP: ABNORMAL
WBC UR QL AUTO: ABNORMAL /HPF

## 2021-05-10 PROCEDURE — 81001 URINALYSIS AUTO W/SCOPE: CPT | Performed by: NURSE PRACTITIONER

## 2021-05-11 DIAGNOSIS — M54.50 CHRONIC BILATERAL LOW BACK PAIN WITHOUT SCIATICA: Primary | ICD-10-CM

## 2021-05-11 DIAGNOSIS — G89.29 CHRONIC BILATERAL LOW BACK PAIN WITHOUT SCIATICA: Primary | ICD-10-CM

## 2021-05-11 DIAGNOSIS — Z79.899 LONG-TERM USE OF HIGH-RISK MEDICATION: ICD-10-CM

## 2021-05-11 DIAGNOSIS — Z51.81 THERAPEUTIC DRUG MONITORING: ICD-10-CM

## 2021-06-22 ENCOUNTER — LAB (OUTPATIENT)
Dept: LAB | Facility: HOSPITAL | Age: 56
End: 2021-06-22

## 2021-06-22 ENCOUNTER — TRANSCRIBE ORDERS (OUTPATIENT)
Dept: ADMINISTRATIVE | Facility: HOSPITAL | Age: 56
End: 2021-06-22

## 2021-06-22 ENCOUNTER — HOSPITAL ENCOUNTER (OUTPATIENT)
Dept: CARDIOLOGY | Facility: HOSPITAL | Age: 56
Discharge: HOME | End: 2021-06-22

## 2021-06-22 DIAGNOSIS — Z01.818 PRE-OP EXAM: Primary | ICD-10-CM

## 2021-06-22 DIAGNOSIS — Z01.818 PRE-OP EXAM: ICD-10-CM

## 2021-06-22 LAB
ALBUMIN SERPL-MCNC: 4.1 G/DL (ref 3.5–5.2)
ALBUMIN/GLOB SERPL: 1.3 G/DL
ALP SERPL-CCNC: 41 U/L (ref 39–117)
ALT SERPL W P-5'-P-CCNC: 25 U/L (ref 1–41)
ANION GAP SERPL CALCULATED.3IONS-SCNC: 9.4 MMOL/L (ref 5–15)
AST SERPL-CCNC: 18 U/L (ref 1–40)
BASOPHILS # BLD AUTO: 0.07 10*3/MM3 (ref 0–0.2)
BASOPHILS NFR BLD AUTO: 0.7 % (ref 0–1.5)
BILIRUB SERPL-MCNC: 0.9 MG/DL (ref 0–1.2)
BUN SERPL-MCNC: 20 MG/DL (ref 6–20)
BUN/CREAT SERPL: 17.2 (ref 7–25)
CALCIUM SPEC-SCNC: 8.9 MG/DL (ref 8.6–10.5)
CHLORIDE SERPL-SCNC: 104 MMOL/L (ref 98–107)
CO2 SERPL-SCNC: 24.6 MMOL/L (ref 22–29)
CREAT SERPL-MCNC: 1.16 MG/DL (ref 0.76–1.27)
DEPRECATED RDW RBC AUTO: 47.9 FL (ref 37–54)
EOSINOPHIL # BLD AUTO: 0.11 10*3/MM3 (ref 0–0.4)
EOSINOPHIL NFR BLD AUTO: 1.1 % (ref 0.3–6.2)
ERYTHROCYTE [DISTWIDTH] IN BLOOD BY AUTOMATED COUNT: 14.4 % (ref 12.3–15.4)
GFR SERPL CREATININE-BSD FRML MDRD: 65 ML/MIN/1.73
GLOBULIN UR ELPH-MCNC: 3.1 GM/DL
GLUCOSE SERPL-MCNC: 70 MG/DL (ref 65–99)
HCT VFR BLD AUTO: 52.3 % (ref 37.5–51)
HGB BLD-MCNC: 17.5 G/DL (ref 13–17.7)
IMM GRANULOCYTES # BLD AUTO: 0.08 10*3/MM3 (ref 0–0.05)
IMM GRANULOCYTES NFR BLD AUTO: 0.8 % (ref 0–0.5)
LYMPHOCYTES # BLD AUTO: 2.22 10*3/MM3 (ref 0.7–3.1)
LYMPHOCYTES NFR BLD AUTO: 22 % (ref 19.6–45.3)
MCH RBC QN AUTO: 30.3 PG (ref 26.6–33)
MCHC RBC AUTO-ENTMCNC: 33.5 G/DL (ref 31.5–35.7)
MCV RBC AUTO: 90.6 FL (ref 79–97)
MONOCYTES # BLD AUTO: 1.16 10*3/MM3 (ref 0.1–0.9)
MONOCYTES NFR BLD AUTO: 11.5 % (ref 5–12)
NEUTROPHILS NFR BLD AUTO: 6.43 10*3/MM3 (ref 1.7–7)
NEUTROPHILS NFR BLD AUTO: 63.9 % (ref 42.7–76)
NRBC BLD AUTO-RTO: 0 /100 WBC (ref 0–0.2)
PLATELET # BLD AUTO: 253 10*3/MM3 (ref 140–450)
PMV BLD AUTO: 9.2 FL (ref 6–12)
POTASSIUM SERPL-SCNC: 4.3 MMOL/L (ref 3.5–5.2)
PROT SERPL-MCNC: 7.2 G/DL (ref 6–8.5)
QT INTERVAL: 358 MS
RBC # BLD AUTO: 5.77 10*6/MM3 (ref 4.14–5.8)
SARS-COV-2 ORF1AB RESP QL NAA+PROBE: NOT DETECTED
SODIUM SERPL-SCNC: 138 MMOL/L (ref 136–145)
WBC # BLD AUTO: 10.07 10*3/MM3 (ref 3.4–10.8)

## 2021-06-22 PROCEDURE — 36415 COLL VENOUS BLD VENIPUNCTURE: CPT

## 2021-06-22 PROCEDURE — U0004 COV-19 TEST NON-CDC HGH THRU: HCPCS

## 2021-06-22 PROCEDURE — 93010 ELECTROCARDIOGRAM REPORT: CPT | Performed by: INTERNAL MEDICINE

## 2021-06-22 PROCEDURE — C9803 HOPD COVID-19 SPEC COLLECT: HCPCS

## 2021-06-22 PROCEDURE — 80053 COMPREHEN METABOLIC PANEL: CPT

## 2021-06-22 PROCEDURE — 93005 ELECTROCARDIOGRAM TRACING: CPT | Performed by: ORTHOPAEDIC SURGERY

## 2021-06-22 PROCEDURE — 85025 COMPLETE CBC W/AUTO DIFF WBC: CPT

## 2021-06-22 RX ORDER — TRIAMCINOLONE ACETONIDE 1 MG/G
CREAM TOPICAL 2 TIMES DAILY
Qty: 15 G | Refills: 1 | Status: SHIPPED | OUTPATIENT
Start: 2021-06-22 | End: 2021-06-28

## 2021-06-22 NOTE — TELEPHONE ENCOUNTER
Caller: [facility] - [address] [phone]    Relationship: Pharmacy    Best call back number:[phone]  Medication needed:   Requested Prescriptions     Pending Prescriptions Disp Refills   • triamcinolone (KENALOG) 0.1 % cream 15 g 1     Sig: Apply  topically to the appropriate area as directed 2 (Two) Times a Day.       When do you need the refill by: ASAP    What additional details did the patient provide when requesting the medication: PATIENT IS REQUESTING A 90 DAY SUPPLY    Does the patient have less than a 3 day supply:  [x] Yes  [] No    What is the patient's preferred pharmacy:  [facility] - [address] [phone] FX

## 2021-06-29 ENCOUNTER — LAB (OUTPATIENT)
Dept: LAB | Facility: HOSPITAL | Age: 56
End: 2021-06-29

## 2021-06-29 ENCOUNTER — TRANSCRIBE ORDERS (OUTPATIENT)
Dept: LAB | Facility: HOSPITAL | Age: 56
End: 2021-06-29

## 2021-06-29 DIAGNOSIS — Z01.818 PRE-OP EXAMINATION: Primary | ICD-10-CM

## 2021-06-29 DIAGNOSIS — Z01.818 PRE-OP EXAMINATION: ICD-10-CM

## 2021-06-29 LAB — SARS-COV-2 ORF1AB RESP QL NAA+PROBE: NOT DETECTED

## 2021-06-29 PROCEDURE — C9803 HOPD COVID-19 SPEC COLLECT: HCPCS

## 2021-06-29 PROCEDURE — U0004 COV-19 TEST NON-CDC HGH THRU: HCPCS

## 2021-06-30 ENCOUNTER — ANESTHESIA (OUTPATIENT)
Dept: PERIOP | Facility: HOSPITAL | Age: 56
End: 2021-06-30

## 2021-06-30 ENCOUNTER — ANESTHESIA EVENT (OUTPATIENT)
Dept: PERIOP | Facility: HOSPITAL | Age: 56
End: 2021-06-30

## 2021-06-30 ENCOUNTER — HOSPITAL ENCOUNTER (OUTPATIENT)
Facility: HOSPITAL | Age: 56
Setting detail: HOSPITAL OUTPATIENT SURGERY
Discharge: HOME | End: 2021-06-30
Attending: ORTHOPAEDIC SURGERY | Admitting: ORTHOPAEDIC SURGERY

## 2021-06-30 VITALS
HEART RATE: 76 BPM | HEIGHT: 72 IN | TEMPERATURE: 97 F | RESPIRATION RATE: 16 BRPM | SYSTOLIC BLOOD PRESSURE: 132 MMHG | WEIGHT: 240 LBS | OXYGEN SATURATION: 96 % | BODY MASS INDEX: 32.51 KG/M2 | DIASTOLIC BLOOD PRESSURE: 91 MMHG

## 2021-06-30 PROCEDURE — 25010000003 CEFAZOLIN IN DEXTROSE 2-4 GM/100ML-% SOLUTION: Performed by: ORTHOPAEDIC SURGERY

## 2021-06-30 PROCEDURE — 25010000002 FENTANYL CITRATE (PF) 50 MCG/ML SOLUTION: Performed by: NURSE ANESTHETIST, CERTIFIED REGISTERED

## 2021-06-30 PROCEDURE — 25010000002 ONDANSETRON PER 1 MG: Performed by: NURSE ANESTHETIST, CERTIFIED REGISTERED

## 2021-06-30 PROCEDURE — 25010000002 NEOSTIGMINE 5 MG/10ML SOLUTION: Performed by: NURSE ANESTHETIST, CERTIFIED REGISTERED

## 2021-06-30 PROCEDURE — 25010000002 PROPOFOL 10 MG/ML EMULSION: Performed by: NURSE ANESTHETIST, CERTIFIED REGISTERED

## 2021-06-30 PROCEDURE — 25010000002 PHENYLEPHRINE PER 1 ML: Performed by: NURSE ANESTHETIST, CERTIFIED REGISTERED

## 2021-06-30 PROCEDURE — C1713 ANCHOR/SCREW BN/BN,TIS/BN: HCPCS | Performed by: ORTHOPAEDIC SURGERY

## 2021-06-30 PROCEDURE — 25010000002 FENTANYL CITRATE (PF) 50 MCG/ML SOLUTION: Performed by: ANESTHESIOLOGY

## 2021-06-30 DEVICE — SUT/ANCH JUGGERKNOT SFT RIGID SHT SZ1 1.4MM W/BIT: Type: IMPLANTABLE DEVICE | Site: HUMERUS | Status: FUNCTIONAL

## 2021-06-30 RX ORDER — DROPERIDOL 2.5 MG/ML
0.62 INJECTION, SOLUTION INTRAMUSCULAR; INTRAVENOUS ONCE AS NEEDED
Status: DISCONTINUED | OUTPATIENT
Start: 2021-06-30 | End: 2021-06-30 | Stop reason: HOSPADM

## 2021-06-30 RX ORDER — OXYCODONE HYDROCHLORIDE AND ACETAMINOPHEN 5; 325 MG/1; MG/1
1 TABLET ORAL EVERY 4 HOURS PRN
Qty: 40 TABLET | Refills: 0 | Status: SHIPPED | OUTPATIENT
Start: 2021-06-30 | End: 2022-01-27

## 2021-06-30 RX ORDER — ONDANSETRON 2 MG/ML
INJECTION INTRAMUSCULAR; INTRAVENOUS AS NEEDED
Status: DISCONTINUED | OUTPATIENT
Start: 2021-06-30 | End: 2021-06-30 | Stop reason: SURG

## 2021-06-30 RX ORDER — MAGNESIUM HYDROXIDE 1200 MG/15ML
LIQUID ORAL AS NEEDED
Status: DISCONTINUED | OUTPATIENT
Start: 2021-06-30 | End: 2021-06-30 | Stop reason: HOSPADM

## 2021-06-30 RX ORDER — SODIUM CHLORIDE 0.9 % (FLUSH) 0.9 %
3-10 SYRINGE (ML) INJECTION AS NEEDED
Status: DISCONTINUED | OUTPATIENT
Start: 2021-06-30 | End: 2021-06-30 | Stop reason: HOSPADM

## 2021-06-30 RX ORDER — FENTANYL CITRATE 50 UG/ML
50 INJECTION, SOLUTION INTRAMUSCULAR; INTRAVENOUS
Status: DISCONTINUED | OUTPATIENT
Start: 2021-06-30 | End: 2021-06-30 | Stop reason: HOSPADM

## 2021-06-30 RX ORDER — OXYCODONE AND ACETAMINOPHEN 7.5; 325 MG/1; MG/1
1 TABLET ORAL ONCE AS NEEDED
Status: COMPLETED | OUTPATIENT
Start: 2021-06-30 | End: 2021-06-30

## 2021-06-30 RX ORDER — ONDANSETRON 2 MG/ML
4 INJECTION INTRAMUSCULAR; INTRAVENOUS ONCE AS NEEDED
Status: DISCONTINUED | OUTPATIENT
Start: 2021-06-30 | End: 2021-06-30 | Stop reason: HOSPADM

## 2021-06-30 RX ORDER — LIDOCAINE HYDROCHLORIDE 10 MG/ML
0.5 INJECTION, SOLUTION EPIDURAL; INFILTRATION; INTRACAUDAL; PERINEURAL ONCE AS NEEDED
Status: DISCONTINUED | OUTPATIENT
Start: 2021-06-30 | End: 2021-06-30 | Stop reason: HOSPADM

## 2021-06-30 RX ORDER — DIPHENHYDRAMINE HYDROCHLORIDE 50 MG/ML
12.5 INJECTION INTRAMUSCULAR; INTRAVENOUS
Status: DISCONTINUED | OUTPATIENT
Start: 2021-06-30 | End: 2021-06-30 | Stop reason: HOSPADM

## 2021-06-30 RX ORDER — PROPOFOL 10 MG/ML
VIAL (ML) INTRAVENOUS AS NEEDED
Status: DISCONTINUED | OUTPATIENT
Start: 2021-06-30 | End: 2021-06-30 | Stop reason: SURG

## 2021-06-30 RX ORDER — FENTANYL CITRATE 50 UG/ML
INJECTION, SOLUTION INTRAMUSCULAR; INTRAVENOUS AS NEEDED
Status: DISCONTINUED | OUTPATIENT
Start: 2021-06-30 | End: 2021-06-30 | Stop reason: SURG

## 2021-06-30 RX ORDER — CEFAZOLIN SODIUM 2 G/100ML
2 INJECTION, SOLUTION INTRAVENOUS ONCE
Status: COMPLETED | OUTPATIENT
Start: 2021-06-30 | End: 2021-06-30

## 2021-06-30 RX ORDER — ENALAPRILAT 2.5 MG/2ML
1.25 INJECTION INTRAVENOUS ONCE AS NEEDED
Status: DISCONTINUED | OUTPATIENT
Start: 2021-06-30 | End: 2021-06-30 | Stop reason: HOSPADM

## 2021-06-30 RX ORDER — LIDOCAINE HYDROCHLORIDE 20 MG/ML
INJECTION, SOLUTION INFILTRATION; PERINEURAL AS NEEDED
Status: DISCONTINUED | OUTPATIENT
Start: 2021-06-30 | End: 2021-06-30 | Stop reason: SURG

## 2021-06-30 RX ORDER — GLYCOPYRROLATE 0.2 MG/ML
INJECTION INTRAMUSCULAR; INTRAVENOUS AS NEEDED
Status: DISCONTINUED | OUTPATIENT
Start: 2021-06-30 | End: 2021-06-30 | Stop reason: SURG

## 2021-06-30 RX ORDER — HYDROMORPHONE HYDROCHLORIDE 1 MG/ML
0.5 INJECTION, SOLUTION INTRAMUSCULAR; INTRAVENOUS; SUBCUTANEOUS
Status: DISCONTINUED | OUTPATIENT
Start: 2021-06-30 | End: 2021-06-30 | Stop reason: HOSPADM

## 2021-06-30 RX ORDER — NEOSTIGMINE METHYLSULFATE 0.5 MG/ML
INJECTION, SOLUTION INTRAVENOUS AS NEEDED
Status: DISCONTINUED | OUTPATIENT
Start: 2021-06-30 | End: 2021-06-30 | Stop reason: SURG

## 2021-06-30 RX ORDER — SODIUM CHLORIDE 0.9 % (FLUSH) 0.9 %
3 SYRINGE (ML) INJECTION EVERY 12 HOURS SCHEDULED
Status: DISCONTINUED | OUTPATIENT
Start: 2021-06-30 | End: 2021-06-30 | Stop reason: HOSPADM

## 2021-06-30 RX ORDER — FAMOTIDINE 10 MG/ML
20 INJECTION, SOLUTION INTRAVENOUS ONCE
Status: COMPLETED | OUTPATIENT
Start: 2021-06-30 | End: 2021-06-30

## 2021-06-30 RX ORDER — SODIUM CHLORIDE, SODIUM LACTATE, POTASSIUM CHLORIDE, CALCIUM CHLORIDE 600; 310; 30; 20 MG/100ML; MG/100ML; MG/100ML; MG/100ML
9 INJECTION, SOLUTION INTRAVENOUS CONTINUOUS
Status: DISCONTINUED | OUTPATIENT
Start: 2021-06-30 | End: 2021-06-30 | Stop reason: HOSPADM

## 2021-06-30 RX ORDER — MIDAZOLAM HYDROCHLORIDE 1 MG/ML
1 INJECTION INTRAMUSCULAR; INTRAVENOUS
Status: DISCONTINUED | OUTPATIENT
Start: 2021-06-30 | End: 2021-06-30 | Stop reason: HOSPADM

## 2021-06-30 RX ORDER — ROCURONIUM BROMIDE 10 MG/ML
INJECTION, SOLUTION INTRAVENOUS AS NEEDED
Status: DISCONTINUED | OUTPATIENT
Start: 2021-06-30 | End: 2021-06-30 | Stop reason: SURG

## 2021-06-30 RX ORDER — NALOXONE HCL 0.4 MG/ML
0.4 VIAL (ML) INJECTION AS NEEDED
Status: DISCONTINUED | OUTPATIENT
Start: 2021-06-30 | End: 2021-06-30 | Stop reason: HOSPADM

## 2021-06-30 RX ADMIN — ROCURONIUM BROMIDE 50 MG: 50 INJECTION INTRAVENOUS at 11:04

## 2021-06-30 RX ADMIN — FAMOTIDINE 20 MG: 10 INJECTION INTRAVENOUS at 10:07

## 2021-06-30 RX ADMIN — OXYCODONE HYDROCHLORIDE AND ACETAMINOPHEN 1 TABLET: 7.5; 325 TABLET ORAL at 12:41

## 2021-06-30 RX ADMIN — PROPOFOL 50 MG: 10 INJECTION, EMULSION INTRAVENOUS at 11:06

## 2021-06-30 RX ADMIN — LIDOCAINE HYDROCHLORIDE 100 MG: 20 INJECTION, SOLUTION INFILTRATION; PERINEURAL at 11:04

## 2021-06-30 RX ADMIN — NEOSTIGMINE METHYLSULFATE 4 MG: 0.5 INJECTION INTRAVENOUS at 11:56

## 2021-06-30 RX ADMIN — PHENYLEPHRINE HYDROCHLORIDE 100 MCG: 10 INJECTION INTRAVENOUS at 11:45

## 2021-06-30 RX ADMIN — ONDANSETRON 4 MG: 2 INJECTION INTRAMUSCULAR; INTRAVENOUS at 11:56

## 2021-06-30 RX ADMIN — FENTANYL CITRATE 100 MCG: 50 INJECTION INTRAMUSCULAR; INTRAVENOUS at 11:04

## 2021-06-30 RX ADMIN — PROPOFOL 250 MG: 10 INJECTION, EMULSION INTRAVENOUS at 11:04

## 2021-06-30 RX ADMIN — GLYCOPYRROLATE 0.6 MG: 0.2 INJECTION INTRAMUSCULAR; INTRAVENOUS at 11:56

## 2021-06-30 RX ADMIN — SODIUM CHLORIDE, POTASSIUM CHLORIDE, SODIUM LACTATE AND CALCIUM CHLORIDE 9 ML/HR: 600; 310; 30; 20 INJECTION, SOLUTION INTRAVENOUS at 10:07

## 2021-06-30 RX ADMIN — FENTANYL CITRATE 50 MCG: 50 INJECTION, SOLUTION INTRAMUSCULAR; INTRAVENOUS at 13:11

## 2021-06-30 RX ADMIN — PHENYLEPHRINE HYDROCHLORIDE 100 MCG: 10 INJECTION INTRAVENOUS at 11:49

## 2021-06-30 RX ADMIN — CEFAZOLIN SODIUM 2 G: 2 INJECTION, SOLUTION INTRAVENOUS at 10:54

## 2021-06-30 RX ADMIN — SODIUM CHLORIDE, POTASSIUM CHLORIDE, SODIUM LACTATE AND CALCIUM CHLORIDE: 600; 310; 30; 20 INJECTION, SOLUTION INTRAVENOUS at 12:08

## 2021-06-30 NOTE — ANESTHESIA POSTPROCEDURE EVALUATION
"Patient: Vini Jones    Procedure Summary     Date: 06/30/21 Room / Location:  [facility]    Anesthesia Start: 1054 Anesthesia Stop: 1216    Procedure: RIGHT ELBOW EXCISION OF THE FRACTURED OSTEOPHYTE WITH REPAIR OF THE TRICEPS TENDON (Right Arm Upper) Diagnosis:     Surgeons: Martín Tena MD Provider: Federico Corea MD    Anesthesia Type: general ASA Status: 3          Anesthesia Type: general    Vitals  Vitals Value Taken Time   /82 06/30/21 1332   Temp 36.1 °C (97 °F) 06/30/21 1213   Pulse 65 06/30/21 1336   Resp 16 06/30/21 1330   SpO2 97 % 06/30/21 1336   Vitals shown include unvalidated device data.        Post Anesthesia Care and Evaluation    Patient location during evaluation: bedside  Patient participation: complete - patient participated  Level of consciousness: awake  Pain management: adequate  Airway patency: patent  Anesthetic complications: No anesthetic complications    Cardiovascular status: acceptable  Respiratory status: acceptable  Hydration status: acceptable    Comments: */82   Pulse 79   Temp 36.1 °C (97 °F) (Temporal)   Resp 16   Ht 182.9 cm (72\")   Wt 109 kg (240 lb)   SpO2 98%   BMI 32.55 kg/m²         "

## 2021-06-30 NOTE — ANESTHESIA PREPROCEDURE EVALUATION
Anesthesia Evaluation     Patient summary reviewed and Nursing notes reviewed   NPO Solid Status: > 8 hours  NPO Liquid Status: > 2 hours           Airway   Mallampati: II  TM distance: >3 FB  Neck ROM: full  Dental - normal exam     Pulmonary - normal exam    breath sounds clear to auscultation  (+) sleep apnea on CPAP,   Cardiovascular - normal exam    ECG reviewed  Rhythm: regular  Rate: normal    (+) hypertension less than 2 medications,   (-) angina, orthopnea, PND, CUI    ROS comment: NORMAL ECG -  Sinus rhythm  No change from prior tracing    Neuro/Psych- negative ROS  GI/Hepatic/Renal/Endo    (+) obesity,  GERD,      Musculoskeletal (-) negative ROS    Abdominal    Substance History - negative use     OB/GYN negative ob/gyn ROS         Other - negative ROS                       Anesthesia Plan    ASA 3     general   (ISB for POPC)  intravenous induction     Anesthetic plan, all risks, benefits, and alternatives have been provided, discussed and informed consent has been obtained with: patient.

## 2021-06-30 NOTE — ANESTHESIA PROCEDURE NOTES
Airway  Urgency: elective    Date/Time: 6/30/2021 11:05 AM  Airway not difficult    General Information and Staff    Patient location during procedure: OR  Anesthesiologist: Federico Corea MD  CRNA: Shalini Francis CRNA    Indications and Patient Condition  Indications for airway management: airway protection    Preoxygenated: yes  MILS not maintained throughout  Mask difficulty assessment: 1 - vent by mask    Final Airway Details  Final airway type: endotracheal airway      Successful airway: ETT  Cuffed: yes   Successful intubation technique: direct laryngoscopy  Facilitating devices/methods: intubating stylet  Endotracheal tube insertion site: oral  Blade: Bari  Blade size: 3  ETT size (mm): 8.0  Cormack-Lehane Classification: grade I - full view of glottis  Placement verified by: chest auscultation   Cuff volume (mL): 9  Measured from: lips  Number of attempts at approach: 1  Assessment: lips, teeth, and gum same as pre-op and atraumatic intubation    Additional Comments  PreO2 100% face mask, IV induction, easy mask, DVL x1, cords noted, tube through, cuff up, EBBSH, +etCO2, = chest movement, tube secured in place, atraumatic, teeth and lips intact as preop.

## 2022-01-27 ENCOUNTER — OFFICE VISIT (OUTPATIENT)
Dept: FAMILY MEDICINE CLINIC | Facility: CLINIC | Age: 57
End: 2022-01-27

## 2022-01-27 VITALS
WEIGHT: 249 LBS | DIASTOLIC BLOOD PRESSURE: 82 MMHG | SYSTOLIC BLOOD PRESSURE: 130 MMHG | OXYGEN SATURATION: 98 % | HEIGHT: 72 IN | TEMPERATURE: 98.1 F | BODY MASS INDEX: 33.72 KG/M2 | HEART RATE: 74 BPM

## 2022-01-27 DIAGNOSIS — K21.9 GASTROESOPHAGEAL REFLUX DISEASE WITHOUT ESOPHAGITIS: ICD-10-CM

## 2022-01-27 DIAGNOSIS — M51.36 BULGING LUMBAR DISC: ICD-10-CM

## 2022-01-27 DIAGNOSIS — I10 PRIMARY HYPERTENSION: Primary | ICD-10-CM

## 2022-01-27 PROBLEM — D12.6 ADENOMATOUS POLYP OF COLON: Status: ACTIVE | Noted: 2022-01-27

## 2022-01-27 PROCEDURE — 99214 OFFICE O/P EST MOD 30 MIN: CPT | Performed by: INTERNAL MEDICINE

## 2022-01-27 RX ORDER — AMLODIPINE BESYLATE 2.5 MG/1
2.5 TABLET ORAL EVERY EVENING
Qty: 90 TABLET | Refills: 3 | Status: SHIPPED | OUTPATIENT
Start: 2022-01-27 | End: 2023-03-23

## 2022-01-27 RX ORDER — FAMOTIDINE 20 MG/1
20 TABLET, FILM COATED ORAL NIGHTLY PRN
Qty: 90 TABLET | Refills: 3 | Status: SHIPPED | OUTPATIENT
Start: 2022-01-27 | End: 2023-04-06

## 2022-01-27 RX ORDER — TRAMADOL HYDROCHLORIDE 50 MG/1
50 TABLET ORAL EVERY 6 HOURS PRN
COMMUNITY
End: 2022-01-27 | Stop reason: SDUPTHER

## 2022-01-27 RX ORDER — TRAMADOL HYDROCHLORIDE 50 MG/1
50 TABLET ORAL EVERY 6 HOURS PRN
Qty: 30 TABLET | Refills: 0 | Status: SHIPPED | OUTPATIENT
Start: 2022-01-27 | End: 2022-07-18

## 2022-01-27 NOTE — PROGRESS NOTES
Subjective   Vini Jones is a 56 y.o. male.     Chief Complaint   Patient presents with   • Hypertension       History of Present Illness   Wife was present during the history-taking and subsequent discussion (and for part of the physical exam) with this patient.  Patient agrees to the presence of the individual during this visit.    Patient establishing new physician from within the [facility] medical group.  Was previously seeing Dr. Cano.    Follow-up for hypertension.  Currently, has been feeling well and asymptomatic without any headaches, vision changes, cough, chest pain, shortness of breath, swelling, focal neurologic deficit, memory loss or syncope.  Has been taking the medications regularly and adherent with the regimen amlodipine 2.5 mg.  Denies medication side effects and no significant interval events.      History of BPH requiring self catheterization and followed by Dr Felix Pearce.  PSA followed and has history of high PSA with biopsy negative.  History of low testosterone and on axiron 2 pumps daily with ED and uses the sildenafil    History of back pain and uses tramadol as needed only uses 30 tabs for several months.    Tested positive for COVID 2 weeks ago.    The following portions of the patient's history were reviewed and updated as appropriate: allergies, current medications, past family history, past medical history, past social history, past surgical history and problem list.    Depression Screen:  PHQ-2/PHQ-9 Depression Screening 4/22/2021   Little interest or pleasure in doing things 0   Feeling down, depressed, or hopeless 0   Total Score 0       Past Medical History:   Diagnosis Date   • Abdominal pain    • Enlarged prostate    • GERD (gastroesophageal reflux disease)    • Hypertension    • Sleep apnea     C-Pap.. INSTRUCTED TO BRING DOS        Past Surgical History:   Procedure Laterality Date   • APPENDECTOMY     • BICEPS TENDON REPAIR Right 6/30/2021    Procedure: RIGHT  ELBOW EXCISION OF THE FRACTURED OSTEOPHYTE WITH REPAIR OF THE TRICEPS TENDON;  Surgeon: Martín Tena MD;  [facility]  Service: Orthopedics;  Laterality: Right;   • BIOPSY PROSTATE NEEDLE / PUNCH / INCISIONAL     • CHOLECYSTECTOMY     • COLONOSCOPY         Family History   Problem Relation Age of Onset   • Heart disease Father          at 69 year old   • Asthma Father    • Heart attack Father    • Malig Hyperthermia Neg Hx        Social History     Socioeconomic History   • Marital status:    Tobacco Use   • Smoking status: Never Smoker   • Smokeless tobacco: Never Used   Substance and Sexual Activity   • Alcohol use: No   • Drug use: No   • Sexual activity: Defer       Current Outpatient Medications   Medication Sig Dispense Refill   • amLODIPine (NORVASC) 2.5 MG tablet Take 1 tablet by mouth Every Evening. 90 tablet 3   • aspirin 81 MG chewable tablet Chew 81 mg Daily.     • AXIRON 30 MG/ACT solution Daily.     • famotidine (PEPCID) 20 MG tablet Take 1 tablet by mouth At Night As Needed for Heartburn. 90 tablet 3   • sildenafil (REVATIO) 20 MG tablet Take 20 mg by mouth 3 (Three) Times a Day As Needed.     • silodosin (RAPAFLO) 8 MG capsule capsule      • traMADol (ULTRAM) 50 MG tablet Take 1 tablet by mouth Every 6 (Six) Hours As Needed for Moderate Pain . 30 tablet 0     No current facility-administered medications for this visit.       Review of Systems   Constitutional: Negative for activity change, appetite change, fatigue, fever, unexpected weight gain and unexpected weight loss.   HENT: Negative for nosebleeds, rhinorrhea, trouble swallowing and voice change.    Eyes: Negative for visual disturbance.   Respiratory: Negative for cough, chest tightness, shortness of breath and wheezing.    Cardiovascular: Negative for chest pain, palpitations and leg swelling.   Gastrointestinal: Negative for abdominal pain, blood in stool, constipation, diarrhea, nausea, vomiting, GERD and  "indigestion.   Genitourinary: Negative for dysuria, frequency and hematuria.        BPH issues with need for self catheter   Musculoskeletal: Negative for arthralgias, back pain and myalgias.   Skin: Negative for rash and wound.   Neurological: Negative for dizziness, tremors, weakness, light-headedness, numbness, headache and memory problem.   Hematological: Negative for adenopathy. Does not bruise/bleed easily.   Psychiatric/Behavioral: Negative for sleep disturbance and depressed mood. The patient is not nervous/anxious.        Objective   /82 (BP Location: Left arm, Patient Position: Sitting, Cuff Size: Adult)   Pulse 74   Temp 98.1 °F (36.7 °C) (Temporal)   Ht 182.9 cm (72\")   Wt 113 kg (249 lb)   SpO2 98%   BMI 33.77 kg/m²     Physical Exam  Vitals and nursing note reviewed.   Constitutional:       General: He is not in acute distress.     Appearance: He is well-developed. He is not diaphoretic.   HENT:      Head: Normocephalic and atraumatic.      Right Ear: External ear normal.      Left Ear: External ear normal.      Nose: Nose normal.   Eyes:      Conjunctiva/sclera: Conjunctivae normal.      Pupils: Pupils are equal, round, and reactive to light.   Neck:      Thyroid: No thyromegaly.      Trachea: No tracheal deviation.   Cardiovascular:      Rate and Rhythm: Normal rate and regular rhythm.      Heart sounds: Normal heart sounds. No murmur heard.  No friction rub. No gallop.    Pulmonary:      Effort: Pulmonary effort is normal. No respiratory distress.      Breath sounds: Normal breath sounds.   Abdominal:      General: Bowel sounds are normal.      Palpations: Abdomen is soft. There is no mass.      Tenderness: There is no abdominal tenderness. There is no guarding.   Musculoskeletal:         General: Normal range of motion.      Cervical back: Normal range of motion and neck supple.   Lymphadenopathy:      Cervical: No cervical adenopathy.   Skin:     General: Skin is warm and dry.      " Capillary Refill: Capillary refill takes less than 2 seconds.      Findings: No rash.   Neurological:      Mental Status: He is alert and oriented to person, place, and time.      Motor: No abnormal muscle tone.      Deep Tendon Reflexes: Reflexes normal.   Psychiatric:         Behavior: Behavior normal.         Thought Content: Thought content normal.         Judgment: Judgment normal.         No results found for this or any previous visit (from the past 2016 hour(s)).  Assessment/Plan   Diagnoses and all orders for this visit:    1. Primary hypertension (Primary)  -     amLODIPine (NORVASC) 2.5 MG tablet; Take 1 tablet by mouth Every Evening.  Dispense: 90 tablet; Refill: 3    2. Bulging lumbar disc  -     traMADol (ULTRAM) 50 MG tablet; Take 1 tablet by mouth Every 6 (Six) Hours As Needed for Moderate Pain .  Dispense: 30 tablet; Refill: 0    3. Gastroesophageal reflux disease without esophagitis  -     famotidine (PEPCID) 20 MG tablet; Take 1 tablet by mouth At Night As Needed for Heartburn.  Dispense: 90 tablet; Refill: 3    Hypertension appears to be very well controlled.  No change at this time continue the amlodipine 2.5 mg daily which is a low dose.  Follow-up every 6 months.  Patient has some chronic intermittent back pain with bulging disc utilize tramadol as needed only #30 was given.  KAYLA run and reviewed.  Risks of the medication include but are not limited to fatigue, somnolence, increased risk of falls, constipation, allergic reaction, dependence, and addiction.  GERD symptoms continue the famotidine 20 mg at night which appears to be doing well.  Continue follow-up with the specialist specifically the urologist for his prostate and testosterone.           · COVID-19 Precautions - Patient was compliant in wearing a mask. When I saw the patient, I used appropriate personal protective equipment (PPE) including mask and eye shield (standard procedure).  Additionally, I used gown and gloves if  indicated.  Hand hygiene was completed before and after seeing the patient.  · Dictated utilizing Dragon Dictation

## 2022-07-17 DIAGNOSIS — M51.36 BULGING LUMBAR DISC: ICD-10-CM

## 2022-07-18 RX ORDER — TRAMADOL HYDROCHLORIDE 50 MG/1
TABLET ORAL
Qty: 30 TABLET | Refills: 0 | Status: SHIPPED | OUTPATIENT
Start: 2022-07-18 | End: 2022-10-10

## 2022-07-18 NOTE — TELEPHONE ENCOUNTER
Rx Refill Note  Requested Prescriptions     Pending Prescriptions Disp Refills   • traMADol (ULTRAM) 50 MG tablet [Pharmacy Med Name: TRAMADOL HCL TABS 50MG] 30 tablet 0     Sig: TAKE 1 TABLET EVERY 6 HOURS AS NEEDED FOR MODERATE PAIN      Last office visit with prescribing clinician: 1/27/2022    Next office visit with prescribing clinician: 8/15/22 1ST AVAIL          Gabby Patino MA  07/18/22, 09:08 EDT

## 2022-08-29 ENCOUNTER — OFFICE VISIT (OUTPATIENT)
Dept: FAMILY MEDICINE CLINIC | Facility: CLINIC | Age: 57
End: 2022-08-29

## 2022-08-29 VITALS
WEIGHT: 251 LBS | TEMPERATURE: 97.7 F | DIASTOLIC BLOOD PRESSURE: 84 MMHG | BODY MASS INDEX: 34 KG/M2 | HEART RATE: 71 BPM | SYSTOLIC BLOOD PRESSURE: 126 MMHG | OXYGEN SATURATION: 98 % | HEIGHT: 72 IN

## 2022-08-29 DIAGNOSIS — M54.50 LUMBAR BACK PAIN: ICD-10-CM

## 2022-08-29 DIAGNOSIS — Z79.899 HIGH RISK MEDICATION USE: ICD-10-CM

## 2022-08-29 DIAGNOSIS — M51.36 BULGING LUMBAR DISC: ICD-10-CM

## 2022-08-29 DIAGNOSIS — I10 PRIMARY HYPERTENSION: Primary | ICD-10-CM

## 2022-08-29 PROCEDURE — 99213 OFFICE O/P EST LOW 20 MIN: CPT | Performed by: INTERNAL MEDICINE

## 2022-08-29 NOTE — PROGRESS NOTES
Subjective   Vini Jones is a 57 y.o. male.     Chief Complaint   Patient presents with   • Back Pain   • Hypertension     6 month f/u       History of Present Illness   Follow-up for hypertension.  Currently, has been feeling well and asymptomatic without any headaches, vision changes, cough, chest pain, shortness of breath, swelling, focal neurologic deficit, memory loss or syncope.  Has been taking the medications regularly and adherent with the regimen amlodipine 2.5 mg.  Denies medication side effects and no significant interval events.       History of BPH requiring self catheterization and followed by Dr Felix Pearce.  PSA followed and has history of high PSA with biopsy negative.  History of low testosterone and on axiron 2 pumps daily with ED and uses the sildenafil     History of back pain and uses tramadol as needed only uses 30 tabs for several months.  No new issues at this time.    The following portions of the patient's history were reviewed and updated as appropriate: allergies, current medications, past family history, past medical history, past social history, past surgical history and problem list.    Depression Screen:  PHQ-2/PHQ-9 Depression Screening 4/22/2021   Retired PHQ-9 Total Score 0   Retired Total Score 0       Past Medical History:   Diagnosis Date   • Abdominal pain    • Enlarged prostate    • GERD (gastroesophageal reflux disease)    • Hypertension    • Sleep apnea     C-Pap.. INSTRUCTED TO BRING DOS        Past Surgical History:   Procedure Laterality Date   • APPENDECTOMY     • BICEPS TENDON REPAIR Right 6/30/2021    Procedure: RIGHT ELBOW EXCISION OF THE FRACTURED OSTEOPHYTE WITH REPAIR OF THE TRICEPS TENDON;  Surgeon: Martín Tena MD;  Location: [facility] OR [facility] [place];  Service: Orthopedics;  Laterality: Right;   • BIOPSY PROSTATE NEEDLE / PUNCH / INCISIONAL     • CHOLECYSTECTOMY     • COLONOSCOPY         Family History   Problem Relation Age of Onset   • Heart disease  Father          at 69 year old   • Asthma Father    • Heart attack Father    • Malig Hyperthermia Neg Hx        Social History     Socioeconomic History   • Marital status:    Tobacco Use   • Smoking status: Never Smoker   • Smokeless tobacco: Never Used   Substance and Sexual Activity   • Alcohol use: No   • Drug use: No   • Sexual activity: Defer       Current Outpatient Medications   Medication Sig Dispense Refill   • amLODIPine (NORVASC) 2.5 MG tablet Take 1 tablet by mouth Every Evening. 90 tablet 3   • aspirin 81 MG chewable tablet Chew 81 mg Daily.     • AXIRON 30 MG/ACT solution Daily.     • famotidine (PEPCID) 20 MG tablet Take 1 tablet by mouth At Night As Needed for Heartburn. 90 tablet 3   • sildenafil (REVATIO) 20 MG tablet Take 20 mg by mouth 3 (Three) Times a Day As Needed.     • silodosin (RAPAFLO) 8 MG capsule capsule      • traMADol (ULTRAM) 50 MG tablet TAKE 1 TABLET EVERY 6 HOURS AS NEEDED FOR MODERATE PAIN 30 tablet 0     No current facility-administered medications for this visit.       Review of Systems   Constitutional: Negative for activity change, appetite change, fatigue, fever, unexpected weight gain and unexpected weight loss.   HENT: Negative for nosebleeds, rhinorrhea, trouble swallowing and voice change.    Eyes: Negative for visual disturbance.   Respiratory: Negative for cough, chest tightness, shortness of breath and wheezing.    Cardiovascular: Negative for chest pain, palpitations and leg swelling.   Gastrointestinal: Negative for abdominal pain, blood in stool, constipation, diarrhea, nausea, vomiting, GERD and indigestion.   Genitourinary: Negative for dysuria, frequency and hematuria.        Patient self cath for urinary retention.   Musculoskeletal: Negative for arthralgias, back pain and myalgias.   Skin: Negative for rash and wound.   Neurological: Negative for dizziness, tremors, weakness, light-headedness, numbness, headache and memory problem.  "  Hematological: Negative for adenopathy. Does not bruise/bleed easily.   Psychiatric/Behavioral: Negative for sleep disturbance and depressed mood. The patient is not nervous/anxious.        Objective   /84   Pulse 71   Temp 97.7 °F (36.5 °C) (Infrared)   Ht 182.9 cm (72\")   Wt 114 kg (251 lb)   SpO2 98%   BMI 34.04 kg/m²     Physical Exam  Vitals and nursing note reviewed.   Constitutional:       General: He is not in acute distress.     Appearance: He is well-developed. He is obese. He is not diaphoretic.   HENT:      Head: Normocephalic and atraumatic.      Right Ear: External ear normal.      Left Ear: External ear normal.      Nose: Nose normal.   Eyes:      Conjunctiva/sclera: Conjunctivae normal.      Pupils: Pupils are equal, round, and reactive to light.   Neck:      Thyroid: No thyromegaly.      Trachea: No tracheal deviation.   Cardiovascular:      Rate and Rhythm: Normal rate and regular rhythm.      Heart sounds: Normal heart sounds. No murmur heard.    No friction rub. No gallop.   Pulmonary:      Effort: Pulmonary effort is normal. No respiratory distress.      Breath sounds: Normal breath sounds.   Abdominal:      General: Bowel sounds are normal.      Palpations: Abdomen is soft. There is no mass.      Tenderness: There is no abdominal tenderness. There is no guarding.   Musculoskeletal:         General: Normal range of motion.      Cervical back: Normal range of motion and neck supple.   Lymphadenopathy:      Cervical: No cervical adenopathy.   Skin:     General: Skin is warm and dry.      Capillary Refill: Capillary refill takes less than 2 seconds.      Findings: No rash.   Neurological:      Mental Status: He is alert and oriented to person, place, and time.      Motor: No abnormal muscle tone.      Deep Tendon Reflexes: Reflexes normal.   Psychiatric:         Behavior: Behavior normal.         Thought Content: Thought content normal.         Judgment: Judgment normal.         No " results found for this or any previous visit (from the past 2016 hour(s)).  Assessment & Plan   Diagnoses and all orders for this visit:    1. Primary hypertension (Primary)  -     Comprehensive Metabolic Panel    2. Bulging lumbar disc    3. Lumbar back pain  -     ToxASSURE Select 13 (MW) - Urine, Clean Catch    4. High risk medication use  -     ToxASSURE Select 13 (MW) - Urine, Clean Catch    Continue the current medications and check the labs as ordered.  No new changes and will continue to follow with urology.           · COVID-19 Precautions - Patient was compliant in wearing a mask. When I saw the patient, I used appropriate personal protective equipment (PPE) including mask and eye shield (standard procedure).  Additionally, I used gown and gloves if indicated.  Hand hygiene was completed before and after seeing the patient.  · Dictated utilizing Dragon Dictation

## 2022-09-02 LAB
ALBUMIN SERPL-MCNC: 4.4 G/DL (ref 3.8–4.9)
ALBUMIN/GLOB SERPL: 1.5 {RATIO} (ref 1.2–2.2)
ALP SERPL-CCNC: 46 IU/L (ref 44–121)
ALT SERPL-CCNC: 24 IU/L (ref 0–44)
AST SERPL-CCNC: 21 IU/L (ref 0–40)
BILIRUB SERPL-MCNC: 0.4 MG/DL (ref 0–1.2)
BUN SERPL-MCNC: 14 MG/DL (ref 6–24)
BUN/CREAT SERPL: 12 (ref 9–20)
CALCIUM SERPL-MCNC: 9 MG/DL (ref 8.7–10.2)
CHLORIDE SERPL-SCNC: 104 MMOL/L (ref 96–106)
CO2 SERPL-SCNC: 21 MMOL/L (ref 20–29)
CREAT SERPL-MCNC: 1.19 MG/DL (ref 0.76–1.27)
DRUGS UR: NORMAL
EGFRCR-CYS SERPLBLD CKD-EPI 2021: 70 ML/MIN/1.73
GLOBULIN SER CALC-MCNC: 3 G/DL (ref 1.5–4.5)
GLUCOSE SERPL-MCNC: 139 MG/DL (ref 65–99)
POTASSIUM SERPL-SCNC: 4.5 MMOL/L (ref 3.5–5.2)
PROT SERPL-MCNC: 7.4 G/DL (ref 6–8.5)
SODIUM SERPL-SCNC: 141 MMOL/L (ref 134–144)

## 2022-10-10 DIAGNOSIS — M51.36 BULGING LUMBAR DISC: ICD-10-CM

## 2022-10-10 RX ORDER — TRAMADOL HYDROCHLORIDE 50 MG/1
TABLET ORAL
Qty: 30 TABLET | Refills: 0 | Status: SHIPPED | OUTPATIENT
Start: 2022-10-10

## 2022-12-12 VITALS
OXYGEN SATURATION: 90 % | SYSTOLIC BLOOD PRESSURE: 120 MMHG | SYSTOLIC BLOOD PRESSURE: 117 MMHG | DIASTOLIC BLOOD PRESSURE: 59 MMHG | HEART RATE: 82 BPM | HEART RATE: 97 BPM | TEMPERATURE: 98.1 F | HEART RATE: 81 BPM | RESPIRATION RATE: 19 BRPM | OXYGEN SATURATION: 99 % | OXYGEN SATURATION: 95 % | DIASTOLIC BLOOD PRESSURE: 71 MMHG | OXYGEN SATURATION: 100 % | HEART RATE: 84 BPM | DIASTOLIC BLOOD PRESSURE: 78 MMHG | OXYGEN SATURATION: 93 % | HEART RATE: 86 BPM | DIASTOLIC BLOOD PRESSURE: 75 MMHG | SYSTOLIC BLOOD PRESSURE: 109 MMHG | RESPIRATION RATE: 14 BRPM | DIASTOLIC BLOOD PRESSURE: 87 MMHG | SYSTOLIC BLOOD PRESSURE: 118 MMHG | HEART RATE: 83 BPM | RESPIRATION RATE: 20 BRPM | SYSTOLIC BLOOD PRESSURE: 112 MMHG | SYSTOLIC BLOOD PRESSURE: 126 MMHG | RESPIRATION RATE: 15 BRPM | DIASTOLIC BLOOD PRESSURE: 70 MMHG | SYSTOLIC BLOOD PRESSURE: 102 MMHG | OXYGEN SATURATION: 97 % | OXYGEN SATURATION: 92 % | SYSTOLIC BLOOD PRESSURE: 122 MMHG | RESPIRATION RATE: 17 BRPM | HEART RATE: 94 BPM | DIASTOLIC BLOOD PRESSURE: 68 MMHG | DIASTOLIC BLOOD PRESSURE: 74 MMHG | SYSTOLIC BLOOD PRESSURE: 94 MMHG | TEMPERATURE: 98 F

## 2023-01-06 VITALS
SYSTOLIC BLOOD PRESSURE: 128 MMHG | OXYGEN SATURATION: 95 % | RESPIRATION RATE: 24 BRPM | HEART RATE: 88 BPM | HEART RATE: 90 BPM | DIASTOLIC BLOOD PRESSURE: 94 MMHG | DIASTOLIC BLOOD PRESSURE: 78 MMHG | SYSTOLIC BLOOD PRESSURE: 125 MMHG | SYSTOLIC BLOOD PRESSURE: 131 MMHG | RESPIRATION RATE: 30 BRPM | HEART RATE: 98 BPM | OXYGEN SATURATION: 98 % | RESPIRATION RATE: 25 BRPM | DIASTOLIC BLOOD PRESSURE: 82 MMHG | SYSTOLIC BLOOD PRESSURE: 127 MMHG | OXYGEN SATURATION: 86 % | DIASTOLIC BLOOD PRESSURE: 72 MMHG | DIASTOLIC BLOOD PRESSURE: 66 MMHG | DIASTOLIC BLOOD PRESSURE: 90 MMHG | HEART RATE: 92 BPM | SYSTOLIC BLOOD PRESSURE: 111 MMHG | OXYGEN SATURATION: 90 % | SYSTOLIC BLOOD PRESSURE: 120 MMHG | RESPIRATION RATE: 13 BRPM | RESPIRATION RATE: 16 BRPM | HEART RATE: 82 BPM | RESPIRATION RATE: 18 BRPM | TEMPERATURE: 97.7 F | OXYGEN SATURATION: 94 % | TEMPERATURE: 97.2 F | HEART RATE: 66 BPM | HEART RATE: 80 BPM | SYSTOLIC BLOOD PRESSURE: 142 MMHG | SYSTOLIC BLOOD PRESSURE: 109 MMHG | RESPIRATION RATE: 21 BRPM | SYSTOLIC BLOOD PRESSURE: 113 MMHG | SYSTOLIC BLOOD PRESSURE: 121 MMHG | DIASTOLIC BLOOD PRESSURE: 77 MMHG | DIASTOLIC BLOOD PRESSURE: 85 MMHG | OXYGEN SATURATION: 97 % | WEIGHT: 250 LBS | DIASTOLIC BLOOD PRESSURE: 73 MMHG | RESPIRATION RATE: 31 BRPM | HEART RATE: 75 BPM | HEART RATE: 91 BPM | HEART RATE: 83 BPM

## 2023-01-12 ENCOUNTER — AMBULATORY SURGICAL CENTER (AMBULATORY)
Dept: URBAN - METROPOLITAN AREA SURGERY 17 | Facility: SURGERY | Age: 58
End: 2023-01-12
Payer: COMMERCIAL

## 2023-01-12 DIAGNOSIS — Z86.010 PERSONAL HISTORY OF COLONIC POLYPS: ICD-10-CM

## 2023-01-12 DIAGNOSIS — Z12.11 ENCOUNTER FOR SCREENING FOR MALIGNANT NEOPLASM OF COLON: ICD-10-CM

## 2023-01-12 PROCEDURE — 45378 DIAGNOSTIC COLONOSCOPY: CPT | Mod: 33 | Performed by: INTERNAL MEDICINE

## 2023-03-23 DIAGNOSIS — I10 PRIMARY HYPERTENSION: ICD-10-CM

## 2023-03-23 RX ORDER — AMLODIPINE BESYLATE 2.5 MG/1
TABLET ORAL
Qty: 90 TABLET | Refills: 0 | Status: SHIPPED | OUTPATIENT
Start: 2023-03-23

## 2023-03-23 NOTE — TELEPHONE ENCOUNTER
LOV             8/29/2022   NOV            Visit date not found   Last RF       1/27/22  1 yr    Called pt, sched a HTN f/u for 4/19/23    ADORE FlynnA/LMR

## 2023-04-05 DIAGNOSIS — K21.9 GASTROESOPHAGEAL REFLUX DISEASE WITHOUT ESOPHAGITIS: ICD-10-CM

## 2023-04-06 RX ORDER — FAMOTIDINE 20 MG/1
TABLET, FILM COATED ORAL
Qty: 90 TABLET | Refills: 3 | Status: SHIPPED | OUTPATIENT
Start: 2023-04-06

## 2023-06-05 ENCOUNTER — OFFICE VISIT (OUTPATIENT)
Dept: FAMILY MEDICINE CLINIC | Facility: CLINIC | Age: 58
End: 2023-06-05
Payer: COMMERCIAL

## 2023-06-05 VITALS
SYSTOLIC BLOOD PRESSURE: 128 MMHG | OXYGEN SATURATION: 96 % | DIASTOLIC BLOOD PRESSURE: 88 MMHG | BODY MASS INDEX: 33.94 KG/M2 | HEIGHT: 72 IN | WEIGHT: 250.6 LBS | HEART RATE: 78 BPM

## 2023-06-05 DIAGNOSIS — K21.9 GASTROESOPHAGEAL REFLUX DISEASE WITHOUT ESOPHAGITIS: ICD-10-CM

## 2023-06-05 DIAGNOSIS — I10 PRIMARY HYPERTENSION: ICD-10-CM

## 2023-06-05 DIAGNOSIS — M51.36 BULGING LUMBAR DISC: ICD-10-CM

## 2023-06-05 DIAGNOSIS — Z00.00 ENCOUNTER FOR ANNUAL PHYSICAL EXAM: Primary | ICD-10-CM

## 2023-06-05 PROBLEM — N13.8 BENIGN PROSTATIC HYPERPLASIA WITH URINARY OBSTRUCTION: Status: ACTIVE | Noted: 2023-06-05

## 2023-06-05 PROBLEM — N40.1 BENIGN PROSTATIC HYPERPLASIA WITH URINARY OBSTRUCTION: Status: ACTIVE | Noted: 2023-06-05

## 2023-06-05 PROBLEM — G47.30 SLEEP APNEA, UNSPECIFIED: Status: ACTIVE | Noted: 2023-06-05

## 2023-06-05 PROBLEM — K57.90 DIVERTICULOSIS: Status: ACTIVE | Noted: 2023-06-05

## 2023-06-05 PROBLEM — K63.5 COLON POLYPS: Status: ACTIVE | Noted: 2023-06-05

## 2023-06-05 PROCEDURE — 99396 PREV VISIT EST AGE 40-64: CPT | Performed by: INTERNAL MEDICINE

## 2023-06-05 RX ORDER — TRAMADOL HYDROCHLORIDE 50 MG/1
50 TABLET ORAL EVERY 8 HOURS PRN
Qty: 30 TABLET | Refills: 0 | Status: SHIPPED | OUTPATIENT
Start: 2023-06-05

## 2023-06-05 RX ORDER — AMLODIPINE BESYLATE 2.5 MG/1
2.5 TABLET ORAL EVERY EVENING
Qty: 90 TABLET | Refills: 3 | Status: SHIPPED | OUTPATIENT
Start: 2023-06-05

## 2023-06-05 RX ORDER — FAMOTIDINE 20 MG/1
20 TABLET, FILM COATED ORAL NIGHTLY PRN
Qty: 90 TABLET | Refills: 3 | Status: SHIPPED | OUTPATIENT
Start: 2023-06-05

## 2023-06-05 NOTE — PROGRESS NOTES
Chief Complaint   Patient presents with    Annual Exam       HPI:  Vini Jones, -1965, is a 57 y.o. male who presents for an annual physical.  Follow-up for hypertension.  Currently, has been feeling well and asymptomatic without any headaches, vision changes, cough, chest pain, shortness of breath, swelling, focal neurologic deficit, memory loss or syncope.  Has been taking the medications regularly and adherent with the regimen amlodipine 2.5 mg.  Denies medication side effects and no significant interval events.       History of BPH requiring self catheterization and followed by Dr Felix Pearce.  PSA followed and has history of high PSA with biopsy negative.  History of low testosterone and on axiron 2 pumps daily with ED and uses the sildenafil     History of back pain and uses tramadol as needed only uses 30 tabs for several months.  No new issues at this time.    Recent Hospitalizations:  No hospitalization(s) within the last year..    Current Medical Providers:  Patient Care Team:  Jaya Miller MD as PCP - General (Internal Medicine)  Felix Pearce MD as Consulting Physician (Urology)    Compared to one year ago, the patient feels his physical health is the same and his mental health is the same.    Depression Screen:      2023     9:25 AM   PHQ-2/PHQ-9 Depression Screening   Little Interest or Pleasure in Doing Things 0-->not at all   Feeling Down, Depressed or Hopeless 0-->not at all   PHQ-9: Brief Depression Severity Measure Score 0       Past Medical/Family/Social History:  The following portions of the patient's history were reviewed and updated as appropriate: allergies, current medications, past family history, past medical history, past social history, past surgical history, and problem list.    Allergies   Allergen Reactions    Hydrocodone Other (See Comments)     INSOMNIA        Current Outpatient Medications:     amLODIPine (NORVASC) 2.5 MG tablet, Take 1 tablet  by mouth Every Evening., Disp: 90 tablet, Rfl: 3    AXIRON 30 MG/ACT solution, Daily., Disp: , Rfl:     famotidine (PEPCID) 20 MG tablet, Take 1 tablet by mouth At Night As Needed for Heartburn., Disp: 90 tablet, Rfl: 3    sildenafil (REVATIO) 20 MG tablet, Take 1 tablet by mouth 3 (Three) Times a Day As Needed., Disp: , Rfl:     silodosin (RAPAFLO) 8 MG capsule capsule, , Disp: , Rfl:     traMADol (ULTRAM) 50 MG tablet, Take 1 tablet by mouth Every 8 (Eight) Hours As Needed for Moderate Pain., Disp: 30 tablet, Rfl: 0    Current medication list contains no high risk medications.  No harmful drug interactions have been identified.     Family History   Problem Relation Age of Onset    Heart disease Father         adpoted father    Asthma Father         adopted father    Heart attack Father     Malig Hyperthermia Neg Hx        Social History     Tobacco Use    Smoking status: Never    Smokeless tobacco: Never   Substance Use Topics    Alcohol use: No       Past Surgical History:   Procedure Laterality Date    APPENDECTOMY      BICEPS TENDON REPAIR Right 06/30/2021    Procedure: RIGHT ELBOW EXCISION OF THE FRACTURED OSTEOPHYTE WITH REPAIR OF THE TRICEPS TENDON;  Surgeon: Martín Tena MD;  Location: [facility] OR [facility] [place];  Service: Orthopedics;  Laterality: Right;    BIOPSY PROSTATE NEEDLE / PUNCH / INCISIONAL      CHOLECYSTECTOMY      COLONOSCOPY      FRACTURE SURGERY  2 years ago    PROSTATE SURGERY  biopsy    2 times       Patient Active Problem List   Diagnosis    Bulging lumbar disc    Hypertension    Adenomatous polyp of colon    GERD (gastroesophageal reflux disease)    Lumbar back pain    Colon polyps    Sleep apnea, unspecified    Diverticulosis    Benign prostatic hyperplasia with urinary obstruction    Encounter for annual physical exam       Review of Systems   Constitutional:  Negative for activity change, appetite change, fatigue, fever, unexpected weight gain and unexpected weight loss.   HENT:  Negative for  "nosebleeds, rhinorrhea, trouble swallowing and voice change.    Eyes:  Negative for visual disturbance.   Respiratory:  Negative for cough, chest tightness, shortness of breath and wheezing.    Cardiovascular:  Negative for chest pain, palpitations and leg swelling.   Gastrointestinal:  Negative for abdominal pain, blood in stool, constipation, diarrhea, nausea, vomiting, GERD and indigestion.   Genitourinary:  Negative for hematuria.        Self catheterization secondary to BPH and followed by urology.   Musculoskeletal:  Negative for arthralgias, back pain and myalgias.   Skin:  Negative for rash and wound.   Neurological:  Negative for dizziness, tremors, weakness, light-headedness, numbness, headache and memory problem.   Hematological:  Negative for adenopathy. Does not bruise/bleed easily.   Psychiatric/Behavioral:  Negative for sleep disturbance and depressed mood. The patient is not nervous/anxious.      Objective     Vitals:    06/05/23 0920 06/05/23 1005   BP: 152/82 128/88   BP Location: Left arm Left arm   Patient Position: Sitting Sitting   Cuff Size: Adult    Pulse: 78    SpO2: 96%    Weight: 114 kg (250 lb 9.6 oz)    Height: 182.9 cm (72\")      BMI is >= 30 and <35. (Class 1 Obesity). The following options were offered after discussion;: weight loss educational material (shared in after visit summary), exercise counseling/recommendations, and nutrition counseling/recommendations    Physical Exam  Vitals and nursing note reviewed.   Constitutional:       General: He is not in acute distress.     Appearance: He is well-developed. He is not diaphoretic.   HENT:      Head: Normocephalic and atraumatic.      Right Ear: External ear normal.      Left Ear: External ear normal.      Nose: Nose normal.   Eyes:      Conjunctiva/sclera: Conjunctivae normal.      Pupils: Pupils are equal, round, and reactive to light.   Neck:      Thyroid: No thyromegaly.      Trachea: No tracheal deviation.   Cardiovascular:     "  Rate and Rhythm: Normal rate and regular rhythm.      Heart sounds: Normal heart sounds. No murmur heard.    No friction rub. No gallop.   Pulmonary:      Effort: Pulmonary effort is normal. No respiratory distress.      Breath sounds: Normal breath sounds.   Abdominal:      General: Bowel sounds are normal.      Palpations: Abdomen is soft. There is no mass.      Tenderness: There is no abdominal tenderness. There is no guarding.   Musculoskeletal:         General: Normal range of motion.      Cervical back: Normal range of motion and neck supple.   Lymphadenopathy:      Cervical: No cervical adenopathy.   Skin:     General: Skin is warm and dry.      Capillary Refill: Capillary refill takes less than 2 seconds.      Findings: No rash.   Neurological:      Mental Status: He is alert and oriented to person, place, and time.      Motor: No abnormal muscle tone.      Deep Tendon Reflexes: Reflexes normal.   Psychiatric:         Behavior: Behavior normal.         Thought Content: Thought content normal.         Judgment: Judgment normal.       Recent Lab Results:     Lab Results   Component Value Date    CHOL 172 04/22/2021    TRIG 159 (H) 04/22/2021    HDL 41 04/22/2021    VLDL 28 04/22/2021    LDLHDL 2.42 04/22/2021       Assessment & Plan   Age-appropriate Screening Schedule:  Refer to the list below for future screening recommendations based on patient's age, sex and/or medical conditions.      Health Maintenance   Topic Date Due    TDAP/TD VACCINES (1 - Tdap) Never done    ZOSTER VACCINE (1 of 2) Never done    HEPATITIS C SCREENING  Never done    ANNUAL PHYSICAL  05/19/2018    COVID-19 Vaccine (3 - Moderna series) 07/07/2021    INFLUENZA VACCINE  08/01/2023    COLORECTAL CANCER SCREENING  01/12/2028    Pneumococcal Vaccine 0-64  Aged Out       Diagnoses and all orders for this visit:    1. Encounter for annual physical exam (Primary)    2. Primary hypertension  -     amLODIPine (NORVASC) 2.5 MG tablet; Take 1  tablet by mouth Every Evening.  Dispense: 90 tablet; Refill: 3  -     Comprehensive Metabolic Panel  -     Lipid Panel    3. Gastroesophageal reflux disease without esophagitis  -     famotidine (PEPCID) 20 MG tablet; Take 1 tablet by mouth At Night As Needed for Heartburn.  Dispense: 90 tablet; Refill: 3    4. Bulging lumbar disc  -     traMADol (ULTRAM) 50 MG tablet; Take 1 tablet by mouth Every 8 (Eight) Hours As Needed for Moderate Pain.  Dispense: 30 tablet; Refill: 0    Annual wellness visit reviewed with patient.  All past history, medications, social history, and problem list were reviewed.  Discussed advanced directives and living will.  Patient has living will: Living will: Patient refused.  Will check the labs as ordered above to evaluate the blood sugars, kidney, liver, cholesterol for screening.  Discussed flu shot recommended to get the influenza vaccine annually in the fall.  Tdap, covid booster, and Shingrix vaccination series discussed.  Encouraged follow-up with the eye doctor on annual basis.  Discussed weight and encouraged exercise as tolerated while following a healthy diet.  Colon cancer screening discussed and current status: colonoscopy last completed 1/12/23 and repeat after 5 years recommended.  Discussed prostate screening and is followed by urology.  Follow up with current specialists as needed.     An After Visit Summary with all of these plans were given to the patient.        Follow Up:  Return in about 1 year (around 6/5/2024) for Next scheduled follow up, Annual physical.

## 2023-06-06 LAB
ALBUMIN SERPL-MCNC: 4.6 G/DL (ref 3.5–5.2)
ALBUMIN/GLOB SERPL: 1.8 G/DL
ALP SERPL-CCNC: 46 U/L (ref 39–117)
ALT SERPL-CCNC: 22 U/L (ref 1–41)
AST SERPL-CCNC: 19 U/L (ref 1–40)
BILIRUB SERPL-MCNC: 0.8 MG/DL (ref 0–1.2)
BUN SERPL-MCNC: 21 MG/DL (ref 6–20)
BUN/CREAT SERPL: 17.2 (ref 7–25)
CALCIUM SERPL-MCNC: 9.7 MG/DL (ref 8.6–10.5)
CHLORIDE SERPL-SCNC: 102 MMOL/L (ref 98–107)
CHOLEST SERPL-MCNC: 179 MG/DL (ref 0–200)
CO2 SERPL-SCNC: 27.1 MMOL/L (ref 22–29)
CREAT SERPL-MCNC: 1.22 MG/DL (ref 0.76–1.27)
EGFRCR SERPLBLD CKD-EPI 2021: 68.3 ML/MIN/1.73
GLOBULIN SER CALC-MCNC: 2.5 GM/DL
GLUCOSE SERPL-MCNC: 104 MG/DL (ref 65–99)
HDLC SERPL-MCNC: 32 MG/DL (ref 40–60)
LDLC SERPL CALC-MCNC: 102 MG/DL (ref 0–100)
POTASSIUM SERPL-SCNC: 4.8 MMOL/L (ref 3.5–5.2)
PROT SERPL-MCNC: 7.1 G/DL (ref 6–8.5)
SODIUM SERPL-SCNC: 138 MMOL/L (ref 136–145)
TRIGL SERPL-MCNC: 260 MG/DL (ref 0–150)
VLDLC SERPL CALC-MCNC: 45 MG/DL (ref 5–40)

## 2023-10-22 DIAGNOSIS — M51.36 BULGING LUMBAR DISC: ICD-10-CM

## 2023-10-22 RX ORDER — TRAMADOL HYDROCHLORIDE 50 MG/1
50 TABLET ORAL EVERY 8 HOURS PRN
Qty: 30 TABLET | Refills: 0 | OUTPATIENT
Start: 2023-10-22

## 2023-10-24 ENCOUNTER — TELEMEDICINE (OUTPATIENT)
Dept: FAMILY MEDICINE CLINIC | Facility: CLINIC | Age: 58
End: 2023-10-24
Payer: COMMERCIAL

## 2023-10-24 DIAGNOSIS — M51.36 BULGING LUMBAR DISC: ICD-10-CM

## 2023-10-24 PROCEDURE — 99213 OFFICE O/P EST LOW 20 MIN: CPT | Performed by: INTERNAL MEDICINE

## 2023-10-24 RX ORDER — TRAMADOL HYDROCHLORIDE 50 MG/1
50 TABLET ORAL EVERY 8 HOURS PRN
Qty: 30 TABLET | Refills: 0 | Status: CANCELLED | OUTPATIENT
Start: 2023-10-24

## 2023-10-24 RX ORDER — TRAMADOL HYDROCHLORIDE 50 MG/1
50 TABLET ORAL EVERY 8 HOURS PRN
Qty: 30 TABLET | Refills: 1 | Status: SHIPPED | OUTPATIENT
Start: 2023-10-24

## 2023-10-24 NOTE — TELEPHONE ENCOUNTER
Pt is requesting a refill on the medication,  traMADol (ULTRAM) 50 MG tablet. Please note pt has scheduled a telemed visit for today 10/24/2023.

## 2023-10-24 NOTE — PROGRESS NOTES
Subjective   Vini Jones is a 58 y.o. male.     Chief Complaint   Patient presents with    Back Pain    Hypertension       History of Present Illness   You have chosen to receive care through a telehealth visit.  Do you consent to use a video/audio connection for your medical care today? Yes  Video visit completed utilizing [employer] video conferencing.    Patient location in home in [facility].  Physician location in [facility].    Follow-up for hypertension.  Currently, has been feeling well and asymptomatic without any headaches, vision changes, cough, chest pain, shortness of breath, swelling, focal neurologic deficit, memory loss or syncope.  Has been taking the medications regularly and adherent with the regimen amlodipine 2.5 mg.  Denies medication side effects and no significant interval events.       History of BPH requiring self catheterization and followed by Dr Felix Pearce.  PSA followed and has history of high PSA with biopsy negative.  History of low testosterone and on axiron 2 pumps daily with ED and uses the sildenafil     History of back pain and uses tramadol as needed only uses 30 tabs for several months.  No new issues at this time.       The following portions of the patient's history were reviewed and updated as appropriate: allergies, current medications, past family history, past medical history, past social history, past surgical history and problem list.    Depression Screen:      6/5/2023     9:25 AM   PHQ-2/PHQ-9 Depression Screening   Little Interest or Pleasure in Doing Things 0-->not at all   Feeling Down, Depressed or Hopeless 0-->not at all   PHQ-9: Brief Depression Severity Measure Score 0       Past Medical History:   Diagnosis Date    Abdominal pain     Allergic     Benign prostatic hyperplasia 6 yrs ago    Ongoing    Colon polyp     Diverticulosis     Enlarged prostate     GERD (gastroesophageal reflux disease)     Hypertension     Low back pain      Pancreatitis 15 yrs ago    1 time    Sleep apnea     C-Pap.. INSTRUCTED TO BRING DOS        Past Surgical History:   Procedure Laterality Date    APPENDECTOMY      BICEPS TENDON REPAIR Right 06/30/2021    Procedure: RIGHT ELBOW EXCISION OF THE FRACTURED OSTEOPHYTE WITH REPAIR OF THE TRICEPS TENDON;  Surgeon: Martín Tena MD;  Location: [facility] OR [facility] [place];  Service: Orthopedics;  Laterality: Right;    BIOPSY PROSTATE NEEDLE / PUNCH / INCISIONAL      CHOLECYSTECTOMY      COLONOSCOPY      FRACTURE SURGERY  2 years ago    PROSTATE SURGERY  biopsy    2 times       Family History   Problem Relation Age of Onset    Heart disease Father         adpoted father    Asthma Father         adopted father    Heart attack Father     Malig Hyperthermia Neg Hx        Social History     Socioeconomic History    Marital status:    Tobacco Use    Smoking status: Never    Smokeless tobacco: Never   Substance and Sexual Activity    Alcohol use: No    Drug use: No    Sexual activity: Yes     Partners: Female     Birth control/protection: None       Current Outpatient Medications   Medication Sig Dispense Refill    traMADol (ULTRAM) 50 MG tablet Take 1 tablet by mouth Every 8 (Eight) Hours As Needed for Moderate Pain. 30 tablet 1    amLODIPine (NORVASC) 2.5 MG tablet Take 1 tablet by mouth Every Evening. 90 tablet 3    AXIRON 30 MG/ACT solution Daily.      famotidine (PEPCID) 20 MG tablet Take 1 tablet by mouth At Night As Needed for Heartburn. 90 tablet 3    sildenafil (REVATIO) 20 MG tablet Take 1 tablet by mouth 3 (Three) Times a Day As Needed.       No current facility-administered medications for this visit.       Review of Systems   Constitutional:  Negative for activity change, appetite change, fatigue, fever, unexpected weight gain and unexpected weight loss.   HENT:  Negative for nosebleeds, rhinorrhea, trouble swallowing and voice change.    Eyes:  Negative for visual disturbance.   Respiratory:  Negative for cough, chest  tightness, shortness of breath and wheezing.    Cardiovascular:  Negative for chest pain, palpitations and leg swelling.   Gastrointestinal:  Negative for abdominal pain, blood in stool, constipation, diarrhea, nausea, vomiting, GERD and indigestion.   Genitourinary:  Positive for difficulty urinating. Negative for dysuria, frequency and hematuria.        Severe BPH issues followed by urology   Musculoskeletal:  Positive for back pain. Negative for arthralgias and myalgias.        Back pain intermittenly and worsened by activity with no weakness or numbness.   Skin:  Negative for rash and wound.   Neurological:  Negative for dizziness, tremors, weakness, light-headedness, numbness, headache and memory problem.   Hematological:  Negative for adenopathy. Does not bruise/bleed easily.   Psychiatric/Behavioral:  Negative for sleep disturbance and depressed mood. The patient is not nervous/anxious.        Objective   There were no vitals taken for this visit.    Physical Exam   Constitutional: He appears well-developed and well-nourished. No distress.   HENT:   Head: Normocephalic and atraumatic.   Pulmonary/Chest: Effort normal.  No respiratory distress.  Neurological: He is alert.   Skin: He is not diaphoretic.   Psychiatric: He has a normal mood and affect.       No results found for this or any previous visit (from the past 2016 hour(s)).  Assessment & Plan   Diagnoses and all orders for this visit:    1. Bulging lumbar disc  -     traMADol (ULTRAM) 50 MG tablet; Take 1 tablet by mouth Every 8 (Eight) Hours As Needed for Moderate Pain.  Dispense: 30 tablet; Refill: 1    KAYLA run and reviewed.  Risks of the medication include but are not limited to fatigue, somnolence, increased risk of falls, constipation, allergic reaction, dependence, and addiction.  Has the back pain that is intermittent and patient utilizing tramadol only as needed and very infrequently.  New prescriptions been sent.  Controlled substance  agreement is in the chart already.  Follow-up in office for the next follow-up in which time we will recheck his blood pressure and likely tox screen.    Video visit completed.       I spent 20 minutes caring for Vini on this date of service. This time includes time spent by me in the following activities:preparing for the visit, reviewing tests, obtaining and/or reviewing a separately obtained history, performing a medically appropriate examination and/or evaluation , counseling and educating the patient/family/caregiver, ordering medications, tests, or procedures, and documenting information in the medical record

## 2024-01-03 ENCOUNTER — OFFICE VISIT (OUTPATIENT)
Dept: FAMILY MEDICINE CLINIC | Facility: CLINIC | Age: 59
End: 2024-01-03
Payer: COMMERCIAL

## 2024-01-03 VITALS
OXYGEN SATURATION: 98 % | SYSTOLIC BLOOD PRESSURE: 118 MMHG | DIASTOLIC BLOOD PRESSURE: 72 MMHG | HEIGHT: 72 IN | BODY MASS INDEX: 34 KG/M2 | WEIGHT: 251 LBS | HEART RATE: 72 BPM

## 2024-01-03 DIAGNOSIS — M25.541 PAIN INVOLVING JOINT OF FINGER OF RIGHT HAND: Primary | ICD-10-CM

## 2024-01-03 PROCEDURE — 99213 OFFICE O/P EST LOW 20 MIN: CPT | Performed by: NURSE PRACTITIONER

## 2024-01-03 RX ORDER — MELOXICAM 7.5 MG/1
7.5 TABLET ORAL DAILY PRN
Qty: 20 TABLET | Refills: 0 | Status: SHIPPED | OUTPATIENT
Start: 2024-01-03

## 2024-01-03 NOTE — PATIENT INSTRUCTIONS
Get x-ray of right finger.  Try Meloxicam short term.  No other anti-inflammatory medications while taking Meloxicam (no Aleve, Ibuprofen, Naproxen, etc.).  Take Meloxicam with food.  Continue ice and elevation.  Follow up pending x-ray results.  Follow up if symptoms persist or worsen.  Follow up as scheduled with hand specialty.

## 2024-01-04 DIAGNOSIS — M25.541 PAIN INVOLVING JOINT OF FINGER OF RIGHT HAND: ICD-10-CM

## 2024-01-04 NOTE — ASSESSMENT & PLAN NOTE
Get x-ray of right finger.  Try Meloxicam short term.  No other anti-inflammatory medications while taking Meloxicam (no Aleve, Ibuprofen, Naproxen, etc.).  Take Meloxicam with food.  Continue ice and elevation.

## 2024-03-08 ENCOUNTER — OFFICE VISIT (OUTPATIENT)
Dept: FAMILY MEDICINE CLINIC | Facility: CLINIC | Age: 59
End: 2024-03-08
Payer: COMMERCIAL

## 2024-03-08 VITALS
SYSTOLIC BLOOD PRESSURE: 128 MMHG | HEIGHT: 72 IN | HEART RATE: 73 BPM | DIASTOLIC BLOOD PRESSURE: 78 MMHG | BODY MASS INDEX: 33.72 KG/M2 | WEIGHT: 249 LBS | OXYGEN SATURATION: 97 % | TEMPERATURE: 98 F

## 2024-03-08 DIAGNOSIS — Z13.220 ENCOUNTER FOR LIPID SCREENING FOR CARDIOVASCULAR DISEASE: ICD-10-CM

## 2024-03-08 DIAGNOSIS — M25.541 PAIN INVOLVING JOINT OF FINGER OF RIGHT HAND: ICD-10-CM

## 2024-03-08 DIAGNOSIS — R06.09 DYSPNEA ON EXERTION: ICD-10-CM

## 2024-03-08 DIAGNOSIS — K21.9 GASTROESOPHAGEAL REFLUX DISEASE WITHOUT ESOPHAGITIS: ICD-10-CM

## 2024-03-08 DIAGNOSIS — R10.13 EPIGASTRIC PAIN: ICD-10-CM

## 2024-03-08 DIAGNOSIS — I10 PRIMARY HYPERTENSION: ICD-10-CM

## 2024-03-08 DIAGNOSIS — M54.2 NECK PAIN: Primary | ICD-10-CM

## 2024-03-08 DIAGNOSIS — Z79.899 HIGH RISK MEDICATION USE: ICD-10-CM

## 2024-03-08 DIAGNOSIS — M51.36 BULGING LUMBAR DISC: ICD-10-CM

## 2024-03-08 DIAGNOSIS — Z13.6 ENCOUNTER FOR LIPID SCREENING FOR CARDIOVASCULAR DISEASE: ICD-10-CM

## 2024-03-08 PROCEDURE — 99214 OFFICE O/P EST MOD 30 MIN: CPT | Performed by: NURSE PRACTITIONER

## 2024-03-08 PROCEDURE — 93000 ELECTROCARDIOGRAM COMPLETE: CPT | Performed by: NURSE PRACTITIONER

## 2024-03-08 RX ORDER — OMEPRAZOLE 20 MG/1
20 CAPSULE, DELAYED RELEASE ORAL DAILY
Qty: 30 CAPSULE | Refills: 1 | Status: SHIPPED | OUTPATIENT
Start: 2024-03-08

## 2024-03-08 NOTE — PATIENT INSTRUCTIONS
Start Omeprazole daily.  Try ice/heat to neck, whichever feels better.  Continue Tylenol as needed for discomfort.  Continue to monitor your blood pressure periodically and record results.  Follow up pending lab results.  Follow up if symptoms persist or worsen.

## 2024-03-08 NOTE — ASSESSMENT & PLAN NOTE
Hypertension is stable and controlled  Continue current treatment regimen.  Ambulatory blood pressure monitoring.  Blood pressure will be reassessed in 3 months.  Continue Amlodipine daily.

## 2024-03-08 NOTE — PROGRESS NOTES
Subjective   Vini Jones is a 58 y.o. male.     Chief Complaint   Patient presents with    Chest Pain     General discomfort X 1-2 weeks     Jaw Pain     Neck jaw pain X 3 weeks goes up neck shoulder jaw        Answers submitted by the patient for this visit:  Primary Reason for Visit (Submitted on 3/7/2024)  What is the primary reason for your visit?: Other  Other (Submitted on 3/7/2024)  Please describe your symptoms.: Shortness of breath, Indigestion , Abdominal bulging, Neck pain  Have you had these symptoms before?: No  How long have you been having these symptoms?: 1-2 weeks    History of Present Illness   Patient presents with c/o neck pain, jaw pain, and epigastric discomfort; started with pain in neck about 3 weeks ago when playing golf; was in middle of playing golf and noted discomfort; attributed to neck strain, but symptoms have persisted; no specific injury; has discomfort in left neck and radiates into jaw some.    Also has discomfort in upper abdomen and indigestion; has noted bulging in abdomen when raises up; takes Pepcid nightly and works well; rare reflux symptoms, particularly if misses a dose.    Also, c/o SOA at times; SOA will come over him randomly at rest or with activity at times; sometimes will have dyspnea, but also played golf recently and did not note SOA; has had some fatigue; attributes to job this time of year.    F/U HTN: takes Amlodipine daily; does not monitor BP; occasional mild headaches; no orthostasis for most part; no swelling.    F/U pain in finger of right hand: saw ortho and had steroid injection for trigger finger and helped; ortho also thought possible gout as well; symptoms resolved with injection into joint.    F/U lower back pain: has bulging disc in lower back; stretching helps; also takes Tramadol as needed when plays golf and helps; will have discomfort in mid lower back, no radiation of pain down legs; no bladder or bowel dysfunction; will need refill  soon.    Has to self cath due to prostate problems.    No alcohol use      The following portions of the patient's history were reviewed and updated as appropriate: allergies, current medications, past family history, past medical history, past social history, past surgical history and problem list.    Current Outpatient Medications on File Prior to Visit   Medication Sig    amLODIPine (NORVASC) 2.5 MG tablet Take 1 tablet by mouth Every Evening.    AXIRON 30 MG/ACT solution Daily.    famotidine (PEPCID) 20 MG tablet Take 1 tablet by mouth At Night As Needed for Heartburn.    sildenafil (REVATIO) 20 MG tablet Take 1 tablet by mouth 3 (Three) Times a Day As Needed.    traMADol (ULTRAM) 50 MG tablet Take 1 tablet by mouth Every 8 (Eight) Hours As Needed for Moderate Pain.    [DISCONTINUED] meloxicam (Mobic) 7.5 MG tablet Take 1 tablet by mouth Daily As Needed for Moderate Pain. Take with food.     No current facility-administered medications on file prior to visit.        Past Medical History:   Diagnosis Date    Abdominal pain     Allergic     Benign prostatic hyperplasia 6 yrs ago    Ongoing    Colon polyp     Diverticulosis     Enlarged prostate     GERD (gastroesophageal reflux disease)     Hypertension     Low back pain     Pain involving joint of finger of right hand 01/03/2024    Pancreatitis 15 yrs ago    1 time    Sleep apnea     C-Pap.. INSTRUCTED TO BRING DOS        Past Surgical History:   Procedure Laterality Date    APPENDECTOMY      BICEPS TENDON REPAIR Right 06/30/2021    Procedure: RIGHT ELBOW EXCISION OF THE FRACTURED OSTEOPHYTE WITH REPAIR OF THE TRICEPS TENDON;  Surgeon: Martín Tena MD;  Location: [facility] [place];  Service: Orthopedics;  Laterality: Right;    BIOPSY PROSTATE NEEDLE / PUNCH / INCISIONAL      CHOLECYSTECTOMY      COLONOSCOPY      FRACTURE SURGERY  2 years ago    PROSTATE SURGERY  biopsy    2 times       Family History   Problem Relation Age of Onset    Heart disease Father         " adpoted father    Asthma Father         adopted father    Heart attack Father     Malig Hyperthermia Neg Hx        Social History     Socioeconomic History    Marital status:    Tobacco Use    Smoking status: Never    Smokeless tobacco: Never   Substance and Sexual Activity    Alcohol use: No    Drug use: No    Sexual activity: Yes     Partners: Female     Birth control/protection: None       Review of Systems   Constitutional:  Positive for fatigue. Negative for appetite change, chills, fever, unexpected weight gain and unexpected weight loss.   HENT:  Negative for ear pain, sinus pressure, sore throat and trouble swallowing.    Eyes:  Negative for discharge. Blurred vision: no acute change in vision, wears glasses.  Respiratory:  Negative for cough and chest tightness. Shortness of breath: see HPI.   Cardiovascular:  Negative for palpitations. Chest pain: has discomfort in epigastric area.  Gastrointestinal:  Negative for blood in stool, constipation and diarrhea. Abdominal pain: see HPI. Acid reflux: see HPI.  Endocrine: Negative for polydipsia.   Genitourinary:  Negative for dysuria and frequency.   Musculoskeletal:  Back pain: see HPI.   Skin:  Negative for rash.   Neurological:  Negative for syncope and weakness.   Hematological:  Does not bruise/bleed easily.       Objective   Vitals:    03/08/24 1146   BP: 128/78   BP Location: Left arm   Patient Position: Sitting   Cuff Size: Adult   Pulse: 73   Temp: 98 °F (36.7 °C)   SpO2: 97%   Weight: 113 kg (249 lb)   Height: 182.9 cm (72\")     Body mass index is 33.77 kg/m².    Physical Exam  Vitals and nursing note reviewed.   Constitutional:       General: He is not in acute distress.     Appearance: He is well-developed and well-groomed. He is not diaphoretic.   HENT:      Head: Normocephalic.      Right Ear: External ear normal.      Left Ear: External ear normal.   Eyes:      Conjunctiva/sclera: Conjunctivae normal.   Neck:     Cardiovascular:      Rate " and Rhythm: Normal rate and regular rhythm.      Pulses: Normal pulses.      Heart sounds: Normal heart sounds. No murmur heard.  Pulmonary:      Effort: Pulmonary effort is normal. No respiratory distress.      Breath sounds: Normal breath sounds.   Chest:      Chest wall: No tenderness.   Abdominal:      General: Bowel sounds are normal.      Palpations: Abdomen is soft. There is no hepatomegaly or splenomegaly.      Tenderness: There is abdominal tenderness in the epigastric area and left upper quadrant. There is no guarding or rebound.      Hernia: No hernia is present.      Comments: Diastasis recti noted   Musculoskeletal:      Cervical back: Normal range of motion and neck supple. No bony tenderness. Pain with movement: neck feels tight with rotation. Normal range of motion.      Thoracic back: No bony tenderness.      Lumbar back: No bony tenderness.      Right lower leg: No edema.      Left lower leg: No edema.   Lymphadenopathy:      Cervical: No cervical adenopathy.   Skin:     General: Skin is warm and dry.      Findings: No rash.   Neurological:      Mental Status: He is alert and oriented to person, place, and time.      Gait: Gait normal.   Psychiatric:         Mood and Affect: Mood normal.         Behavior: Behavior normal.         Thought Content: Thought content normal.         Cognition and Memory: Cognition normal.         Judgment: Judgment normal.           ECG 12 Lead    Date/Time: 3/8/2024 12:39 PM  Performed by: Emilee Mendoza APRN    Authorized by: Emilee Mendoza APRN  Comparison: compared with previous ECG from 6/22/2021  Similar to previous ECG  Rhythm: sinus rhythm  Rate: normal  T Waves: T waves normal    Clinical impression: non-specific ECG            Lab Results   Component Value Date    WBC 10.07 06/22/2021    RBC 5.77 06/22/2021    HGB 17.5 06/22/2021    HCT 52.3 (H) 06/22/2021    MCV 90.6 06/22/2021    MCH 30.3 06/22/2021    MCHC 33.5 06/22/2021    RDW 14.4 06/22/2021    RDWSD  47.9 06/22/2021    MPV 9.2 06/22/2021     06/22/2021    NEUTRORELPCT 63.9 06/22/2021    LYMPHORELPCT 22.0 06/22/2021    MONORELPCT 11.5 06/22/2021    EOSRELPCT 1.1 06/22/2021    BASORELPCT 0.7 06/22/2021    AUTOIGPER 0.8 (H) 06/22/2021    NEUTROABS 6.43 06/22/2021    LYMPHSABS 2.22 06/22/2021    MONOSABS 1.16 (H) 06/22/2021    EOSABS 0.11 06/22/2021    BASOSABS 0.07 06/22/2021    AUTOIGNUM 0.08 (H) 06/22/2021    NRBC 0.0 06/22/2021     Lab Results   Component Value Date    GLUCOSE 104 (H) 06/05/2023    BUN 21 (H) 06/05/2023    CREATININE 1.22 06/05/2023    EGFRIFNONA 65 06/22/2021    EGFRIFAFRI  09/23/2016      Comment:      <15 Indicative of kidney failure.    BCR 17.2 06/05/2023    K 4.8 06/05/2023    CO2 27.1 06/05/2023    CALCIUM 9.7 06/05/2023    PROTENTOTREF 7.1 06/05/2023    ALBUMIN 4.6 06/05/2023    LABIL2 1.8 06/05/2023    AST 19 06/05/2023    ALT 22 06/05/2023      Lab Results   Component Value Date    CHLPL 179 06/05/2023    TRIG 260 (H) 06/05/2023    HDL 32 (L) 06/05/2023    VLDL 45 (H) 06/05/2023     (H) 06/05/2023     Lab Results   Component Value Date    TSH 2.280 04/22/2021            Assessment    Problem List Items Addressed This Visit       Bulging lumbar disc    Current Assessment & Plan     Continue regular stretching.         Hypertension    Current Assessment & Plan     Hypertension is stable and controlled  Continue current treatment regimen.  Ambulatory blood pressure monitoring.  Blood pressure will be reassessed in 3 months.  Continue Amlodipine daily.         GERD (gastroesophageal reflux disease)    Current Assessment & Plan     Start Omeprazole daily instead of Pepcid.         Relevant Medications    omeprazole (priLOSEC) 20 MG capsule    Other Relevant Orders    CBC & Differential    Dyspnea on exertion    Relevant Orders    Ambulatory Referral to Cardiology    Epigastric pain    Current Assessment & Plan     Start Omeprazole daily.         Relevant Medications     omeprazole (priLOSEC) 20 MG capsule    Other Relevant Orders    Comprehensive Metabolic Panel    CBC & Differential    Lipase    ECG 12 Lead    Neck pain - Primary    Current Assessment & Plan     Try ice/heat to neck, whichever feels better.  Continue Tylenol as needed for discomfort.         Relevant Orders    Comprehensive Metabolic Panel    CBC & Differential    ECG 12 Lead    Ambulatory Referral to Cardiology    RESOLVED: Pain involving joint of finger of right hand    Current Assessment & Plan     Resolved with injection per ortho.         Relevant Orders    Uric Acid     Other Visit Diagnoses       High risk medication use        Relevant Orders    ToxASSURE Select 13 (MW) - Urine, Clean Catch    Encounter for lipid screening for cardiovascular disease        Relevant Orders    Lipid Panel With LDL / HDL Ratio             Return if symptoms worsen or fail to improve.  KAYLA run and reviewed.  CSA on file.  Discussed risks with Tramadol include but are not limited to fatigue, somnolence, increased risk of falls, constipation, allergic reaction, dependence, and addiction.  Patient accepts risks.  Discussed concerns with epigastric discomfort and neck pain radiating to jaw at times; ECG stable; will check labs today; will also refer to cardiology for further evaluation; instructed to go to the ER if chest pain or if symptoms worsen.

## 2024-03-09 LAB
ALBUMIN SERPL-MCNC: 4.7 G/DL (ref 3.8–4.9)
ALBUMIN/GLOB SERPL: 1.6 {RATIO} (ref 1.2–2.2)
ALP SERPL-CCNC: 48 IU/L (ref 44–121)
ALT SERPL-CCNC: 26 IU/L (ref 0–44)
AST SERPL-CCNC: 20 IU/L (ref 0–40)
BASOPHILS # BLD AUTO: 0.1 X10E3/UL (ref 0–0.2)
BASOPHILS NFR BLD AUTO: 1 %
BILIRUB SERPL-MCNC: 0.7 MG/DL (ref 0–1.2)
BUN SERPL-MCNC: 22 MG/DL (ref 8–27)
BUN/CREAT SERPL: 17 (ref 10–24)
CALCIUM SERPL-MCNC: 9.5 MG/DL (ref 8.6–10.2)
CHLORIDE SERPL-SCNC: 101 MMOL/L (ref 96–106)
CHOLEST SERPL-MCNC: 192 MG/DL (ref 100–199)
CO2 SERPL-SCNC: 24 MMOL/L (ref 20–29)
CREAT SERPL-MCNC: 1.32 MG/DL (ref 0.76–1.27)
EGFRCR SERPLBLD CKD-EPI 2021: 62 ML/MIN/1.73
EOSINOPHIL # BLD AUTO: 0.1 X10E3/UL (ref 0–0.4)
EOSINOPHIL NFR BLD AUTO: 1 %
ERYTHROCYTE [DISTWIDTH] IN BLOOD BY AUTOMATED COUNT: 13 % (ref 11.6–15.4)
GLOBULIN SER CALC-MCNC: 2.9 G/DL (ref 1.5–4.5)
GLUCOSE SERPL-MCNC: 97 MG/DL (ref 70–99)
HCT VFR BLD AUTO: 55 % (ref 37.5–51)
HDLC SERPL-MCNC: 36 MG/DL
HGB BLD-MCNC: 18.5 G/DL (ref 13–17.7)
IMM GRANULOCYTES # BLD AUTO: 0.1 X10E3/UL (ref 0–0.1)
IMM GRANULOCYTES NFR BLD AUTO: 1 %
LDLC SERPL CALC-MCNC: 119 MG/DL (ref 0–99)
LDLC/HDLC SERPL: 3.3 RATIO (ref 0–3.6)
LIPASE SERPL-CCNC: 26 U/L (ref 13–78)
LYMPHOCYTES # BLD AUTO: 1.8 X10E3/UL (ref 0.7–3.1)
LYMPHOCYTES NFR BLD AUTO: 22 %
MCH RBC QN AUTO: 29.7 PG (ref 26.6–33)
MCHC RBC AUTO-ENTMCNC: 33.6 G/DL (ref 31.5–35.7)
MCV RBC AUTO: 88 FL (ref 79–97)
MONOCYTES # BLD AUTO: 0.8 X10E3/UL (ref 0.1–0.9)
MONOCYTES NFR BLD AUTO: 10 %
NEUTROPHILS # BLD AUTO: 5.2 X10E3/UL (ref 1.4–7)
NEUTROPHILS NFR BLD AUTO: 65 %
PLATELET # BLD AUTO: 298 X10E3/UL (ref 150–450)
POTASSIUM SERPL-SCNC: 4.9 MMOL/L (ref 3.5–5.2)
PROT SERPL-MCNC: 7.6 G/DL (ref 6–8.5)
RBC # BLD AUTO: 6.22 X10E6/UL (ref 4.14–5.8)
SODIUM SERPL-SCNC: 138 MMOL/L (ref 134–144)
TRIGL SERPL-MCNC: 211 MG/DL (ref 0–149)
URATE SERPL-MCNC: 7.3 MG/DL (ref 3.8–8.4)
VLDLC SERPL CALC-MCNC: 37 MG/DL (ref 5–40)
WBC # BLD AUTO: 7.9 X10E3/UL (ref 3.4–10.8)

## 2024-03-17 LAB — DRUGS UR: NORMAL

## 2024-03-18 DIAGNOSIS — M51.36 BULGING LUMBAR DISC: ICD-10-CM

## 2024-03-18 RX ORDER — TRAMADOL HYDROCHLORIDE 50 MG/1
50 TABLET ORAL EVERY 8 HOURS PRN
Qty: 30 TABLET | Refills: 0 | Status: SHIPPED | OUTPATIENT
Start: 2024-03-18

## 2024-03-28 ENCOUNTER — OFFICE VISIT (OUTPATIENT)
Age: 59
End: 2024-03-28
Payer: COMMERCIAL

## 2024-03-28 VITALS
WEIGHT: 252 LBS | BODY MASS INDEX: 34.18 KG/M2 | SYSTOLIC BLOOD PRESSURE: 124 MMHG | OXYGEN SATURATION: 98 % | DIASTOLIC BLOOD PRESSURE: 74 MMHG | HEART RATE: 83 BPM

## 2024-03-28 DIAGNOSIS — R06.09 DYSPNEA ON EXERTION: Primary | ICD-10-CM

## 2024-03-28 DIAGNOSIS — R07.9 CHEST PAIN, UNSPECIFIED TYPE: ICD-10-CM

## 2024-03-28 DIAGNOSIS — I10 PRIMARY HYPERTENSION: ICD-10-CM

## 2024-03-28 DIAGNOSIS — G47.33 OBSTRUCTIVE SLEEP APNEA SYNDROME: ICD-10-CM

## 2024-03-28 PROCEDURE — 93000 ELECTROCARDIOGRAM COMPLETE: CPT | Performed by: INTERNAL MEDICINE

## 2024-03-28 PROCEDURE — 99204 OFFICE O/P NEW MOD 45 MIN: CPT | Performed by: INTERNAL MEDICINE

## 2024-03-28 NOTE — PROGRESS NOTES
Subjective:     Encounter Date:03/28/2024      Patient ID: Vini Jones is a 58 y.o. male.    Chief Complaint:  History of Present Illness    This is a 58-year-old with hypertension, obstructive sleep apnea, who presents for evaluation of shortness of breath, neck and chest discomfort.    The patient reports that a few weeks ago while out on the golf course he had a sudden onset of pain in the left side of his neck.  This lasted for several days and became quite severe.  Even radiated up into his left side of his jaw.  The symptoms would worsen with certain movement of his left upper extremity and neck.  Symptoms have gradually improved.  He only really feels it now when he moves his arm a certain way.  Around this time he started noticing shortness of breath primarily at rest.  He reports some chronic dyspnea on exertion when he goes up a flight of stairs but feels like this is unchanged.  He does not really notice the shortness of breath when he is otherwise active including when playing golf.  He also around that time and noted some pressure in his epigastric region.  This was associated with belching.  Sometimes this is associated with the shortness of breath but other times not.  He does not really associate the epigastric pressure with exertion.  He feels like this occurred mainly while at rest also.  He was started on omeprazole after seeing ERIC Grewal recently.  He reports this helped those symptoms some.  He also underwent an EKG at that office visit that they felt may be a little bit abnormal.  As a result of his symptoms and the EKG he was referred here for further evaluation.    It appears that he did undergo a stress echocardiogram in 7/2019 for symptoms of shortness of breath and chest discomfort.  The patient admits that he was under a great deal of stress at that time.  A stress echocardiogram at that time showed normal left ventricular systolic function and wall motion with an EF of  67% and grade 1 diastolic dysfunction with no significant valvular disease.  The stress portion showed no evidence of ischemia and post-rest EF of 79%.  He denies any family history of coronary artery disease.    Review of Systems   Constitutional: Negative for malaise/fatigue.   HENT:  Negative for hearing loss, hoarse voice, nosebleeds and sore throat.    Eyes:  Negative for pain.   Cardiovascular:  Positive for chest pain and dyspnea on exertion. Negative for claudication, cyanosis, irregular heartbeat, leg swelling, near-syncope, orthopnea, palpitations, paroxysmal nocturnal dyspnea and syncope.   Respiratory:  Negative for shortness of breath and snoring.    Endocrine: Negative for cold intolerance, heat intolerance, polydipsia, polyphagia and polyuria.   Skin:  Negative for itching and rash.   Musculoskeletal:  Positive for neck pain. Negative for arthritis, falls, joint pain, joint swelling, muscle cramps, muscle weakness and myalgias.   Gastrointestinal:  Positive for abdominal pain. Negative for constipation, diarrhea, dysphagia, heartburn, hematemesis, hematochezia, melena, nausea and vomiting.   Genitourinary:  Negative for frequency, hematuria and hesitancy.   Neurological:  Negative for excessive daytime sleepiness, dizziness, headaches, light-headedness, numbness and weakness.   Psychiatric/Behavioral:  Negative for depression. The patient is not nervous/anxious.          Current Outpatient Medications:     amLODIPine (NORVASC) 2.5 MG tablet, Take 1 tablet by mouth Every Evening., Disp: 90 tablet, Rfl: 3    AXIRON 30 MG/ACT solution, Daily., Disp: , Rfl:     famotidine (PEPCID) 20 MG tablet, Take 1 tablet by mouth At Night As Needed for Heartburn., Disp: 90 tablet, Rfl: 3    omeprazole (priLOSEC) 20 MG capsule, Take 1 capsule by mouth Daily., Disp: 30 capsule, Rfl: 1    sildenafil (REVATIO) 20 MG tablet, Take 1 tablet by mouth 3 (Three) Times a Day As Needed., Disp: , Rfl:     traMADol (ULTRAM) 50 MG  tablet, Take 1 tablet by mouth Every 8 (Eight) Hours As Needed for Moderate Pain., Disp: 30 tablet, Rfl: 0    Past Medical History:   Diagnosis Date    Abdominal pain     Allergic     Benign prostatic hyperplasia 6 yrs ago    Ongoing    Colon polyp     Diverticulosis     Enlarged prostate     GERD (gastroesophageal reflux disease)     Hypertension     Low back pain     Pain involving joint of finger of right hand 01/03/2024    Pancreatitis 15 yrs ago    1 time    Sleep apnea     C-Pap.. INSTRUCTED TO BRING DOS        Past Surgical History:   Procedure Laterality Date    APPENDECTOMY      BICEPS TENDON REPAIR Right 06/30/2021    Procedure: RIGHT ELBOW EXCISION OF THE FRACTURED OSTEOPHYTE WITH REPAIR OF THE TRICEPS TENDON;  Surgeon: Martín Tena MD;  Location: [facility] OR [facility] – [place];  Service: Orthopedics;  Laterality: Right;    BIOPSY PROSTATE NEEDLE / PUNCH / INCISIONAL      CHOLECYSTECTOMY      COLONOSCOPY      FRACTURE SURGERY  2 years ago    PROSTATE SURGERY  biopsy    2 times       Family History   Problem Relation Age of Onset    Heart disease Father         adpoted father    Asthma Father         adopted father    Heart attack Father     Malig Hyperthermia Neg Hx        Social History     Tobacco Use    Smoking status: Never     Passive exposure: Never    Smokeless tobacco: Never   Vaping Use    Vaping status: Never Used   Substance Use Topics    Alcohol use: No    Drug use: No         ECG 12 Lead    Date/Time: 3/28/2024 10:32 AM  Performed by: Ashleigh Medina MD    Authorized by: Ashleigh Medina MD  Comparison: compared with previous ECG   Similar to previous ECG  Rhythm: sinus rhythm             Objective:     Visit Vitals  /74 (BP Location: Left arm, Patient Position: Sitting, Cuff Size: Adult)   Pulse 83   Wt 114 kg (252 lb)   SpO2 98%   BMI 34.18 kg/m²         Constitutional:       Appearance: Normal appearance. Well-developed.   HENT:      Head: Normocephalic and atraumatic.   Neck:      Vascular: No  carotid bruit or JVD.   Pulmonary:      Effort: Pulmonary effort is normal.      Breath sounds: Normal breath sounds.   Cardiovascular:      Normal rate. Regular rhythm.      No gallop.    Pulses:     Radial: 2+ bilaterally.  Edema:     Peripheral edema absent.   Abdominal:      Palpations: Abdomen is soft.   Skin:     General: Skin is warm and dry.   Neurological:      Mental Status: Alert and oriented to person, place, and time.           Assessment:          Diagnosis Plan   1. Dyspnea on exertion        2. Primary hypertension        3. Obstructive sleep apnea syndrome               Plan:       1.  Shortness of breath/neck pain/epigastric pressure.  Symptoms are somewhat atypical.  I believe his neck pain is all musculoskeletal and not anginal.  Shortness of breath is an ongoing issue but seems to be more of an issue at rest and does not appear to have progressed with exertion.  Epigastric pressure is also atypical sounding.  EKG today is unremarkable and unchanged compared to his prior tracing.  Discussed options with the patient including just monitoring his symptoms since the overall are better versus proceeding with a stress echocardiogram for further reassurance.  Went ahead and placed an order for a stress echocardiogram.  2.  Hypertension will well-controlled on his current regimen medications.  3.  Obstructive sleep apnea.  On CPAP.    I will call and discuss results of the stress echocardiogram and determine further recommendations based on those results.

## 2024-03-29 ENCOUNTER — PATIENT ROUNDING (BHMG ONLY) (OUTPATIENT)
Dept: CARDIOLOGY | Facility: CLINIC | Age: 59
End: 2024-03-29
Payer: COMMERCIAL

## 2024-03-29 NOTE — PROGRESS NOTES
A My Chart message has been sent to the patient for PATIENT ROUNDING with [facility] [place]

## 2024-04-04 ENCOUNTER — TRANSCRIBE ORDERS (OUTPATIENT)
Dept: ADMINISTRATIVE | Facility: HOSPITAL | Age: 59
End: 2024-04-04
Payer: COMMERCIAL

## 2024-04-04 DIAGNOSIS — N13.8 BPH WITH URINARY OBSTRUCTION: Primary | ICD-10-CM

## 2024-04-04 DIAGNOSIS — N40.0 BPH WITH ELEVATED PSA: ICD-10-CM

## 2024-04-04 DIAGNOSIS — R97.20 BPH WITH ELEVATED PSA: ICD-10-CM

## 2024-04-04 DIAGNOSIS — N40.1 BPH WITH URINARY OBSTRUCTION: Primary | ICD-10-CM

## 2024-04-10 ENCOUNTER — TELEPHONE (OUTPATIENT)
Dept: CARDIOLOGY | Facility: CLINIC | Age: 59
End: 2024-04-10
Payer: COMMERCIAL

## 2024-04-11 ENCOUNTER — HOSPITAL ENCOUNTER (OUTPATIENT)
Dept: CARDIOLOGY | Facility: HOSPITAL | Age: 59
Discharge: HOME | End: 2024-04-11
Payer: COMMERCIAL

## 2024-04-11 VITALS
OXYGEN SATURATION: 98 % | SYSTOLIC BLOOD PRESSURE: 132 MMHG | WEIGHT: 251.32 LBS | DIASTOLIC BLOOD PRESSURE: 92 MMHG | HEIGHT: 72 IN | HEART RATE: 87 BPM | BODY MASS INDEX: 34.04 KG/M2

## 2024-04-11 DIAGNOSIS — I10 PRIMARY HYPERTENSION: ICD-10-CM

## 2024-04-11 DIAGNOSIS — R06.09 DYSPNEA ON EXERTION: ICD-10-CM

## 2024-04-11 DIAGNOSIS — R07.9 CHEST PAIN, UNSPECIFIED TYPE: ICD-10-CM

## 2024-04-11 LAB
AORTIC ARCH: 2.9 CM
AORTIC DIMENSIONLESS INDEX: 0.9 (DI)
ASCENDING AORTA: 3.2 CM
BH CV ECHO MEAS - ACS: 2.17 CM
BH CV ECHO MEAS - AO MAX PG: 6.3 MMHG
BH CV ECHO MEAS - AO MEAN PG: 3.5 MMHG
BH CV ECHO MEAS - AO ROOT DIAM: 2.9 CM
BH CV ECHO MEAS - AO V2 MAX: 125.1 CM/SEC
BH CV ECHO MEAS - AO V2 VTI: 25.6 CM
BH CV ECHO MEAS - AVA(I,D): 3.8 CM2
BH CV ECHO MEAS - EDV(CUBED): 100.9 ML
BH CV ECHO MEAS - EDV(MOD-SP2): 162 ML
BH CV ECHO MEAS - EDV(MOD-SP4): 160 ML
BH CV ECHO MEAS - EF(MOD-BP): 56 %
BH CV ECHO MEAS - EF(MOD-SP2): 56.8 %
BH CV ECHO MEAS - EF(MOD-SP4): 55.6 %
BH CV ECHO MEAS - ESV(CUBED): 17.4 ML
BH CV ECHO MEAS - ESV(MOD-SP2): 70 ML
BH CV ECHO MEAS - ESV(MOD-SP4): 71 ML
BH CV ECHO MEAS - FS: 44.3 %
BH CV ECHO MEAS - IVS/LVPW: 0.93 CM
BH CV ECHO MEAS - IVSD: 1.07 CM
BH CV ECHO MEAS - LAT PEAK E' VEL: 5.7 CM/SEC
BH CV ECHO MEAS - LV DIASTOLIC VOL/BSA (35-75): 68.9 CM2
BH CV ECHO MEAS - LV MASS(C)D: 186.8 GRAMS
BH CV ECHO MEAS - LV MAX PG: 4.9 MMHG
BH CV ECHO MEAS - LV MEAN PG: 3 MMHG
BH CV ECHO MEAS - LV SYSTOLIC VOL/BSA (12-30): 30.6 CM2
BH CV ECHO MEAS - LV V1 MAX: 110.7 CM/SEC
BH CV ECHO MEAS - LV V1 VTI: 22.7 CM
BH CV ECHO MEAS - LVIDD: 4.7 CM
BH CV ECHO MEAS - LVIDS: 2.6 CM
BH CV ECHO MEAS - LVOT AREA: 4.2 CM2
BH CV ECHO MEAS - LVOT DIAM: 2.32 CM
BH CV ECHO MEAS - LVPWD: 1.15 CM
BH CV ECHO MEAS - MED PEAK E' VEL: 6.5 CM/SEC
BH CV ECHO MEAS - MV A DUR: 0.15 SEC
BH CV ECHO MEAS - MV A MAX VEL: 64.3 CM/SEC
BH CV ECHO MEAS - MV DEC SLOPE: 398.6 CM/SEC2
BH CV ECHO MEAS - MV DEC TIME: 0.17 SEC
BH CV ECHO MEAS - MV E MAX VEL: 60.8 CM/SEC
BH CV ECHO MEAS - MV E/A: 0.95
BH CV ECHO MEAS - MV MAX PG: 3.3 MMHG
BH CV ECHO MEAS - MV MEAN PG: 1.41 MMHG
BH CV ECHO MEAS - MV P1/2T: 62.8 MSEC
BH CV ECHO MEAS - MV V2 VTI: 23.9 CM
BH CV ECHO MEAS - MVA(P1/2T): 3.5 CM2
BH CV ECHO MEAS - MVA(VTI): 4 CM2
BH CV ECHO MEAS - PA ACC TIME: 0.06 SEC
BH CV ECHO MEAS - PA V2 MAX: 134.4 CM/SEC
BH CV ECHO MEAS - PULM A REVS DUR: 0.14 SEC
BH CV ECHO MEAS - PULM A REVS VEL: 30.4 CM/SEC
BH CV ECHO MEAS - PULM DIAS VEL: 49.1 CM/SEC
BH CV ECHO MEAS - PULM S/D: 1
BH CV ECHO MEAS - PULM SYS VEL: 49.1 CM/SEC
BH CV ECHO MEAS - QP/QS: 0.97
BH CV ECHO MEAS - RV MAX PG: 1.44 MMHG
BH CV ECHO MEAS - RV V1 MAX: 60 CM/SEC
BH CV ECHO MEAS - RV V1 VTI: 14.1 CM
BH CV ECHO MEAS - RVOT DIAM: 2.9 CM
BH CV ECHO MEAS - SI(MOD-SP2): 39.6 ML/M2
BH CV ECHO MEAS - SI(MOD-SP4): 38.4 ML/M2
BH CV ECHO MEAS - SV(LVOT): 96.1 ML
BH CV ECHO MEAS - SV(MOD-SP2): 92 ML
BH CV ECHO MEAS - SV(MOD-SP4): 89 ML
BH CV ECHO MEAS - SV(RVOT): 93.3 ML
BH CV ECHO MEAS - TAPSE (>1.6): 1.94 CM
BH CV ECHO MEAS - TR MAX PG: 21.7 MMHG
BH CV ECHO MEAS - TR MAX VEL: 232.9 CM/SEC
BH CV ECHO MEASUREMENTS AVERAGE E/E' RATIO: 9.97
BH CV STRESS BP STAGE 1: NORMAL
BH CV STRESS BP STAGE 2: NORMAL
BH CV STRESS DURATION MIN STAGE 1: 3
BH CV STRESS DURATION MIN STAGE 2: 3
BH CV STRESS DURATION SEC STAGE 1: 0
BH CV STRESS DURATION SEC STAGE 2: 0
BH CV STRESS ECHO POST STRESS EJECTION FRACTION EF: 73 %
BH CV STRESS GRADE STAGE 1: 10
BH CV STRESS GRADE STAGE 2: 12
BH CV STRESS HR STAGE 1: 114
BH CV STRESS HR STAGE 2: 141
BH CV STRESS METS STAGE 1: 5
BH CV STRESS METS STAGE 2: 7.5
BH CV STRESS PROTOCOL 1: NORMAL
BH CV STRESS SPEED STAGE 1: 1.7
BH CV STRESS SPEED STAGE 2: 2.5
BH CV STRESS STAGE 1: 1
BH CV STRESS STAGE 2: 2
BH CV XLRA - RV BASE: 3.5 CM
BH CV XLRA - RV MID: 4 CM
BH CV XLRA - TDI S': 11 CM/SEC
LEFT ATRIUM VOLUME INDEX: 22.5 ML/M2
MAXIMAL PREDICTED HEART RATE: 160 BPM
PERCENT MAX PREDICTED HR: 88.13 %
SINUS: 3 CM
STJ: 3.2 CM
STRESS BASELINE BP: NORMAL MMHG
STRESS BASELINE HR: 62 BPM
STRESS PERCENT HR: 104 %
STRESS POST ESTIMATED WORKLOAD: 7.5 METS
STRESS POST EXERCISE DUR MIN: 6 MIN
STRESS POST EXERCISE DUR SEC: 0 SEC
STRESS POST PEAK BP: NORMAL MMHG
STRESS POST PEAK HR: 141 BPM
STRESS TARGET HR: 136 BPM

## 2024-04-11 PROCEDURE — 93320 DOPPLER ECHO COMPLETE: CPT

## 2024-04-11 PROCEDURE — 25510000001 PERFLUTREN (DEFINITY) 8.476 MG IN SODIUM CHLORIDE (PF) 0.9 % 10 ML INJECTION: Performed by: INTERNAL MEDICINE

## 2024-04-11 PROCEDURE — 93017 CV STRESS TEST TRACING ONLY: CPT

## 2024-04-11 PROCEDURE — 93350 STRESS TTE ONLY: CPT

## 2024-04-11 PROCEDURE — 93325 DOPPLER ECHO COLOR FLOW MAPG: CPT

## 2024-04-11 RX ADMIN — PERFLUTREN 5 ML: 6.52 INJECTION, SUSPENSION INTRAVENOUS at 10:41

## 2024-05-16 ENCOUNTER — HOSPITAL ENCOUNTER (OUTPATIENT)
Facility: HOSPITAL | Age: 59
Discharge: HOME | End: 2024-05-16
Payer: COMMERCIAL

## 2024-05-16 DIAGNOSIS — R97.20 BPH WITH ELEVATED PSA: ICD-10-CM

## 2024-05-16 DIAGNOSIS — N40.1 BPH WITH URINARY OBSTRUCTION: ICD-10-CM

## 2024-05-16 DIAGNOSIS — N13.8 BPH WITH URINARY OBSTRUCTION: ICD-10-CM

## 2024-05-16 DIAGNOSIS — N40.0 BPH WITH ELEVATED PSA: ICD-10-CM

## 2024-05-16 PROCEDURE — A9577 INJ MULTIHANCE: HCPCS

## 2024-05-16 PROCEDURE — 72197 MRI PELVIS W/O & W/DYE: CPT

## 2024-05-16 PROCEDURE — 0 GADOBENATE DIMEGLUMINE 529 MG/ML SOLUTION

## 2024-05-16 RX ADMIN — GADOBENATE DIMEGLUMINE 20 ML: 529 INJECTION, SOLUTION INTRAVENOUS at 08:51

## 2024-05-30 DIAGNOSIS — I10 PRIMARY HYPERTENSION: ICD-10-CM

## 2024-05-30 RX ORDER — AMLODIPINE BESYLATE 2.5 MG/1
2.5 TABLET ORAL EVERY EVENING
Qty: 90 TABLET | Refills: 0 | Status: SHIPPED | OUTPATIENT
Start: 2024-05-30

## 2024-06-27 DIAGNOSIS — K21.9 GASTROESOPHAGEAL REFLUX DISEASE WITHOUT ESOPHAGITIS: ICD-10-CM

## 2024-06-27 RX ORDER — FAMOTIDINE 20 MG/1
TABLET, FILM COATED ORAL
Qty: 90 TABLET | Refills: 1 | Status: SHIPPED | OUTPATIENT
Start: 2024-06-27

## 2024-07-22 DIAGNOSIS — M51.36 BULGING LUMBAR DISC: ICD-10-CM

## 2024-07-23 RX ORDER — TRAMADOL HYDROCHLORIDE 50 MG/1
50 TABLET ORAL EVERY 8 HOURS PRN
Qty: 30 TABLET | Refills: 0 | OUTPATIENT
Start: 2024-07-23

## 2024-08-05 ENCOUNTER — OFFICE VISIT (OUTPATIENT)
Dept: FAMILY MEDICINE CLINIC | Facility: CLINIC | Age: 59
End: 2024-08-05
Payer: COMMERCIAL

## 2024-08-05 VITALS
BODY MASS INDEX: 34.59 KG/M2 | HEIGHT: 72 IN | OXYGEN SATURATION: 94 % | HEART RATE: 91 BPM | DIASTOLIC BLOOD PRESSURE: 86 MMHG | WEIGHT: 255.4 LBS | SYSTOLIC BLOOD PRESSURE: 132 MMHG | TEMPERATURE: 98 F

## 2024-08-05 DIAGNOSIS — M51.36 BULGING LUMBAR DISC: ICD-10-CM

## 2024-08-05 DIAGNOSIS — I10 PRIMARY HYPERTENSION: Primary | ICD-10-CM

## 2024-08-05 DIAGNOSIS — Z23 IMMUNIZATION DUE: ICD-10-CM

## 2024-08-05 PROBLEM — E66.811 OBESITY (BMI 30.0-34.9): Status: ACTIVE | Noted: 2024-08-05

## 2024-08-05 PROBLEM — E66.9 OBESITY (BMI 30.0-34.9): Status: ACTIVE | Noted: 2024-08-05

## 2024-08-05 PROCEDURE — 99396 PREV VISIT EST AGE 40-64: CPT | Performed by: INTERNAL MEDICINE

## 2024-08-05 PROCEDURE — 90715 TDAP VACCINE 7 YRS/> IM: CPT | Performed by: INTERNAL MEDICINE

## 2024-08-05 PROCEDURE — 90471 IMMUNIZATION ADMIN: CPT | Performed by: INTERNAL MEDICINE

## 2024-08-05 RX ORDER — RIZATRIPTAN BENZOATE 10 MG/1
10 TABLET, ORALLY DISINTEGRATING ORAL
COMMUNITY
Start: 2024-06-18

## 2024-08-05 RX ORDER — AMLODIPINE BESYLATE 2.5 MG/1
2.5 TABLET ORAL EVERY EVENING
Qty: 90 TABLET | Refills: 3 | Status: SHIPPED | OUTPATIENT
Start: 2024-08-05

## 2024-08-05 RX ORDER — TRAMADOL HYDROCHLORIDE 50 MG/1
50 TABLET ORAL EVERY 8 HOURS PRN
Qty: 30 TABLET | Refills: 0 | Status: SHIPPED | OUTPATIENT
Start: 2024-08-05

## 2024-08-05 NOTE — PROGRESS NOTES
Chief Complaint   Patient presents with    Annual Exam     Answers submitted by the patient for this visit:  Primary Reason for Visit (Submitted on 2024)  What is the primary reason for your visit?: Other  Other (Submitted on 2024)  Please describe your symptoms.: Prescription refill required office visit., , I would also like to see if Dr Miller can send in a prescription for my CPAP supplies to [facility].  I visited Sleep doctor before, but they just check my heart.  I don't see why Dr Miller can't do the same as he does my annual physical.  Have you had these symptoms before?: Yes  How long have you been having these symptoms?: Greater than 2 weeks  Please list any medications you are currently taking for this condition.: Tramadol, , CPAP supplies at [facility]  Please describe any probable cause for these symptoms. : Tramadol - back pain related to golf, , Sleep apnea    HPI:  Vini Jones, -1965, is a 58 y.o. male who presents for an annual physical.    Follow-up for hypertension.  Currently, has been feeling well and asymptomatic without any headaches, vision changes, cough, chest pain, shortness of breath, swelling, focal neurologic deficit, memory loss or syncope.  Has been taking the medications regularly and adherent with the regimen amlodipine 2.5 mg.  Denies medication side effects and no significant interval events.       History of BPH requiring self catheterization and followed by Dr Felix Pearce.  PSA followed and has history of high PSA with biopsy negative.  History of low testosterone and on axiron 2 pumps daily with ED and uses the sildenafil.  Patient is currently seeing urology Dr. Lambert Pearce and noted to have an elevated PSA on 3/28/24 of 18.05.  MRI of the prostate on 2024 demonstrated severe BPH but no other findings.     One episode of ocular migraine and given rizatriptan and resolved.    History of back pain and uses tramadol as needed only  uses 30 tabs for several months.  No new issues at this time.  Last tramadol refill on 320 4/24/2020 1 pills.  Last tox assure on 3/8/2024 and appropriate.  Last pain management agreement signed on 1/27/2022 and unchanged.    Patient has history of obstructive sleep apnea utilizing CPAP needing order for refill for his supplies.  Previously seen by   Recent Hospitalizations:  No hospitalization(s) within the last year..    Current Medical Providers:  Patient Care Team:  Jaya Miller MD as PCP - General (Internal Medicine)  Felix Pearce MD as Consulting Physician (Urology)  Ashleigh Medina MD as Consulting Physician (Cardiology)  Martín Tena MD as Consulting Physician (Orthopedic Surgery)    Compared to one year ago, the patient feels his physical health is the same and his mental health is the same.    Depression Screen:      8/5/2024    10:00 AM   PHQ-2/PHQ-9 Depression Screening   Little Interest or Pleasure in Doing Things 0-->not at all   Feeling Down, Depressed or Hopeless 0-->not at all   PHQ-9: Brief Depression Severity Measure Score 0       Past Medical/Family/Social History:  The following portions of the patient's history were reviewed and updated as appropriate: allergies, current medications, past family history, past medical history, past social history, past surgical history, and problem list.    Allergies   Allergen Reactions    Hydrocodone Other (See Comments)     INSOMNIA          Current Outpatient Medications:     amLODIPine (NORVASC) 2.5 MG tablet, Take 1 tablet by mouth Every Evening., Disp: 90 tablet, Rfl: 3    AXIRON 30 MG/ACT solution, Daily., Disp: , Rfl:     famotidine (PEPCID) 20 MG tablet, TAKE 1 TABLET AT NIGHT AS NEEDED FOR HEARTBURN, Disp: 90 tablet, Rfl: 1    rizatriptan MLT (MAXALT-MLT) 10 MG disintegrating tablet, Take 1 tablet by mouth., Disp: , Rfl:     sildenafil (REVATIO) 20 MG tablet, Take 1 tablet by mouth 3 (Three) Times a Day As Needed., Disp: , Rfl:      traMADol (ULTRAM) 50 MG tablet, Take 1 tablet by mouth Every 8 (Eight) Hours As Needed for Moderate Pain., Disp: 30 tablet, Rfl: 0    Current medication list contains medication of tramadol for high risk but no interactions with other medicines noted.  Discussed risks of the medication which patient is understanding.    Family History   Problem Relation Age of Onset    Arthritis Mother         Jones    Heart disease Father         adpoted father    Asthma Father         adopted father    Heart attack Father     Malig Hyperthermia Neg Hx        Social History     Tobacco Use    Smoking status: Never     Passive exposure: Never    Smokeless tobacco: Never   Substance Use Topics    Alcohol use: No       Past Surgical History:   Procedure Laterality Date    APPENDECTOMY      BICEPS TENDON REPAIR Right 06/30/2021    Procedure: RIGHT ELBOW EXCISION OF THE FRACTURED OSTEOPHYTE WITH REPAIR OF THE TRICEPS TENDON;  Surgeon: Martín Tena MD;  [facility]: [facility] OR [facility] [place];  Service: Orthopedics;  Laterality: Right;    BIOPSY PROSTATE NEEDLE / PUNCH / INCISIONAL      CHOLECYSTECTOMY      COLONOSCOPY      FRACTURE SURGERY  2 years ago    PROSTATE SURGERY  biopsy    2 times       Patient Active Problem List   Diagnosis    Bulging lumbar disc    Hypertension    Adenomatous polyp of colon    GERD (gastroesophageal reflux disease)    Lumbar back pain    Colon polyps    Sleep apnea, unspecified    Diverticulosis    Benign prostatic hyperplasia with urinary obstruction    Encounter for annual physical exam    Dyspnea on exertion    Epigastric pain    Neck pain    Obesity (BMI 30.0-34.9)       Review of Systems   Constitutional:  Negative for activity change, appetite change, fatigue, fever, unexpected weight gain and unexpected weight loss.   HENT:  Negative for nosebleeds, rhinorrhea, trouble swallowing and voice change.    Eyes:  Negative for visual disturbance.   Respiratory:  Negative for cough, chest tightness, shortness of  "breath and wheezing.    Cardiovascular:  Negative for chest pain, palpitations and leg swelling.   Gastrointestinal:  Negative for abdominal pain, blood in stool, constipation, diarrhea, nausea, vomiting, GERD and indigestion.   Genitourinary:  Negative for dysuria, frequency and hematuria.   Musculoskeletal:  Negative for arthralgias, back pain and myalgias.   Skin:  Negative for rash and wound.   Neurological:  Negative for dizziness, tremors, weakness, light-headedness, numbness, headache and memory problem.   Hematological:  Negative for adenopathy. Does not bruise/bleed easily.   Psychiatric/Behavioral:  Negative for sleep disturbance and depressed mood. The patient is not nervous/anxious.        Objective     Vitals:    08/05/24 1028   BP: 132/86   BP Location: Left arm   Patient Position: Sitting   Cuff Size: Large Adult   Pulse: 91   Temp: 98 °F (36.7 °C)   TempSrc: Temporal   SpO2: 94%   Weight: 116 kg (255 lb 6.4 oz)   Height: 183 cm (72.05\")       BMI is >= 30 and <35. (Class 1 Obesity). The following options were offered after discussion;: weight loss educational material (shared in after visit summary), exercise counseling/recommendations, and nutrition counseling/recommendations      No results found.    Physical Exam  Vitals and nursing note reviewed.   Constitutional:       General: He is not in acute distress.     Appearance: He is well-developed. He is not diaphoretic.   HENT:      Head: Normocephalic and atraumatic.      Right Ear: External ear normal.      Left Ear: External ear normal.      Nose: Nose normal.   Eyes:      Conjunctiva/sclera: Conjunctivae normal.      Pupils: Pupils are equal, round, and reactive to light.   Neck:      Thyroid: No thyromegaly.      Trachea: No tracheal deviation.   Cardiovascular:      Rate and Rhythm: Normal rate and regular rhythm.      Heart sounds: Normal heart sounds. No murmur heard.     No friction rub. No gallop.   Pulmonary:      Effort: Pulmonary effort " is normal. No respiratory distress.      Breath sounds: Normal breath sounds.   Abdominal:      General: Bowel sounds are normal.      Palpations: Abdomen is soft. There is no mass.      Tenderness: There is no abdominal tenderness. There is no guarding.   Musculoskeletal:         General: Normal range of motion.      Cervical back: Normal range of motion and neck supple.   Lymphadenopathy:      Cervical: No cervical adenopathy.   Skin:     General: Skin is warm and dry.      Capillary Refill: Capillary refill takes less than 2 seconds.      Findings: No rash.   Neurological:      Mental Status: He is alert and oriented to person, place, and time.      Motor: No abnormal muscle tone.      Deep Tendon Reflexes: Reflexes normal.   Psychiatric:         Behavior: Behavior normal.         Thought Content: Thought content normal.         Judgment: Judgment normal.         Recent Lab Results:     Lab Results   Component Value Date    CHOL 172 04/22/2021    TRIG 211 (H) 03/08/2024    HDL 36 (L) 03/08/2024    VLDL 37 03/08/2024    LDLHDL 3.3 03/08/2024       Assessment & Plan   Age-appropriate Screening Schedule:  Refer to the list below for future screening recommendations based on patient's age, sex and/or medical conditions.      Health Maintenance   Topic Date Due    ZOSTER VACCINE (1 of 2) Never done    HEPATITIS C SCREENING  Never done    COVID-19 Vaccine (3 - 2023-24 season) 09/01/2023    ANNUAL PHYSICAL  06/05/2024    INFLUENZA VACCINE  08/01/2024    BMI FOLLOWUP  08/05/2025    COLORECTAL CANCER SCREENING  01/12/2028    TDAP/TD VACCINES (2 - Td or Tdap) 08/05/2034    Pneumococcal Vaccine 0-64  Aged Out       Diagnoses and all orders for this visit:    1. Primary hypertension (Primary)  -     amLODIPine (NORVASC) 2.5 MG tablet; Take 1 tablet by mouth Every Evening.  Dispense: 90 tablet; Refill: 3    2. Bulging lumbar disc  -     traMADol (ULTRAM) 50 MG tablet; Take 1 tablet by mouth Every 8 (Eight) Hours As Needed for  Moderate Pain.  Dispense: 30 tablet; Refill: 0    3. Immunization due  -     Tdap Vaccine => 6yo IM (BOOSTRIX/ADACEL)  -     Shingrix Vaccine; Future    Annual wellness visit reviewed with patient.  All past history, medications, social history, and problem list were reviewed.  Discussed advanced directives and living will.  Patient has living will: Living will: Patient refused.  Will check the labs as ordered above to evaluate the blood sugars, kidney, liver, cholesterol for screening.  Discussed flu shot recommended to get the influenza vaccine annually in the fall.  Tdap, COVID-19 booster, and Shingrix vaccination series discussed and patient will consider.  Encouraged follow-up with the eye doctor on annual basis.  Discussed weight and encouraged exercise as tolerated while following a healthy diet.  Reviewed sexual health and safe sex practices.  Colon cancer screening discussed and current status: colonoscopy last completed on 1/12/2023 with repeat after 5 years recommended.  Discussed prostate screening.  Follow up with current specialists as needed.     Continue the CPAP with the nasal pillows through [facility].  If needs new machine in future then will need repeat sleep study.  KAYLA run and reviewed.  Risks of the medication include but are not limited to fatigue, somnolence, increased risk of falls, constipation, allergic reaction, dependence, and addiction.  An After Visit Summary with all of these plans were given to the patient.        Follow Up:  No follow-ups on file.

## 2024-08-31 DIAGNOSIS — M51.36 BULGING LUMBAR DISC: ICD-10-CM

## 2024-09-03 RX ORDER — TRAMADOL HYDROCHLORIDE 50 MG/1
50 TABLET ORAL EVERY 8 HOURS PRN
Qty: 21 TABLET | Refills: 0 | Status: SHIPPED | OUTPATIENT
Start: 2024-09-03

## 2024-12-24 DIAGNOSIS — K21.9 GASTROESOPHAGEAL REFLUX DISEASE WITHOUT ESOPHAGITIS: ICD-10-CM

## 2024-12-26 RX ORDER — FAMOTIDINE 20 MG/1
TABLET, FILM COATED ORAL
Qty: 90 TABLET | Refills: 3 | Status: SHIPPED | OUTPATIENT
Start: 2024-12-26

## 2025-01-20 ENCOUNTER — OFFICE VISIT (OUTPATIENT)
Dept: FAMILY MEDICINE CLINIC | Facility: CLINIC | Age: 60
End: 2025-01-20
Payer: COMMERCIAL

## 2025-01-20 VITALS
OXYGEN SATURATION: 96 % | TEMPERATURE: 96.8 F | DIASTOLIC BLOOD PRESSURE: 84 MMHG | SYSTOLIC BLOOD PRESSURE: 128 MMHG | HEIGHT: 72 IN | HEART RATE: 82 BPM | BODY MASS INDEX: 34.65 KG/M2 | WEIGHT: 255.8 LBS

## 2025-01-20 DIAGNOSIS — H91.91 DECREASED HEARING OF RIGHT EAR: ICD-10-CM

## 2025-01-20 DIAGNOSIS — I10 PRIMARY HYPERTENSION: ICD-10-CM

## 2025-01-20 DIAGNOSIS — R22.31 LUMP OF AXILLA, RIGHT: Primary | ICD-10-CM

## 2025-01-20 DIAGNOSIS — J06.9 UPPER RESPIRATORY TRACT INFECTION, UNSPECIFIED TYPE: ICD-10-CM

## 2025-01-20 PROCEDURE — 99213 OFFICE O/P EST LOW 20 MIN: CPT | Performed by: NURSE PRACTITIONER

## 2025-01-20 NOTE — PATIENT INSTRUCTIONS
Increase intake of clear liquids and rest.  Try plain Mucinex to thin secretions.  Try nasal steroid, such as Flonase or Nasacort.  May try over the counter Delsym as needed for cough.  Follow up pending lab results.  Follow up in 3 months for physical with fasting labs, or sooner if symptoms persist or worsen.

## 2025-01-20 NOTE — PROGRESS NOTES
Subjective   Vini Jones is a 59 y.o. male.     Chief Complaint   Patient presents with    Mass     He has two lumps under his right armpit. He said at first they were tender but are not know. He said he was on antibiotics before he believes for this and they went away    Cough     He has had a cough for four or five days but has no other symptoms and feels fine     Answers submitted by the patient for this visit:  Primary Reason for Visit (Submitted on 1/18/2025)  What is the primary reason for your visit?: Problem Not Listed      History of Present Illness   Patient presents with c/o 2 lumps in axilla on right; areas were tender and red at first, but not now; symptoms started about 1 week ago; uses Axiron in armpits and may affect symptoms; no fever; no other lumps; no decreased ROM of shoulder; has had similar symptoms in past that resolved with antibiotic.    Also, c/o cough x4-5 days; some productive cough--clear color; no change in stuffy nose in mornings, attributes to allergies; no SOA; no sore throat; no sinus symptoms; no OTC treatment.    Also, has had chronic decreased hearing in right ear; symptoms started years ago, but getting worse the last 5-6 months; no problems hearing on left; would referral to ENT.    F/U HTN: takes Amlodipine daily; does not typically monitor BP; some mild headaches recently; has been working on computer more with job and likely contributing to headaches; no orthostasis; no swelling.    Sees urology      The following portions of the patient's history were reviewed and updated as appropriate: allergies, current medications, past family history, past medical history, past social history, past surgical history and problem list.    Current Outpatient Medications on File Prior to Visit   Medication Sig    amLODIPine (NORVASC) 2.5 MG tablet Take 1 tablet by mouth Every Evening.    AXIRON 30 MG/ACT solution Daily.    famotidine (PEPCID) 20 MG tablet TAKE 1 TABLET AT NIGHT AS  NEEDED FOR HEARTBURN    sildenafil (REVATIO) 20 MG tablet Take 1 tablet by mouth 3 (Three) Times a Day As Needed.    traMADol (ULTRAM) 50 MG tablet TAKE 1 TABLET EVERY 8 HOURS AS NEEDED FOR MODERATE PAIN    rizatriptan MLT (MAXALT-MLT) 10 MG disintegrating tablet Take 1 tablet by mouth. (Patient not taking: Reported on 1/20/2025)     No current facility-administered medications on file prior to visit.        Past Medical History:   Diagnosis Date    Abdominal pain     Allergic     Benign prostatic hyperplasia 6 yrs ago    Ongoing    Colon polyp     Diverticulosis     Enlarged prostate     GERD (gastroesophageal reflux disease)     Hypertension     Low back pain     Pain involving joint of finger of right hand 01/03/2024    Pancreatitis 15 yrs ago    1 time    Sleep apnea     C-Pap.. INSTRUCTED TO BRING DOS        Past Surgical History:   Procedure Laterality Date    APPENDECTOMY      BICEPS TENDON REPAIR Right 06/30/2021    Procedure: RIGHT ELBOW EXCISION OF THE FRACTURED OSTEOPHYTE WITH REPAIR OF THE TRICEPS TENDON;  Surgeon: Martín Tena MD;  Location: [facility] OR [facility] [place];  Service: Orthopedics;  Laterality: Right;    BIOPSY PROSTATE NEEDLE / PUNCH / INCISIONAL      CHOLECYSTECTOMY      COLONOSCOPY      FRACTURE SURGERY  2 years ago    PROSTATE SURGERY  biopsy    2 times       Family History   Problem Relation Age of Onset    Arthritis Mother         Jones    Heart disease Father         adpoted father    Asthma Father         adopted father    Heart attack Father     Malig Hyperthermia Neg Hx        Social History     Socioeconomic History    Marital status:    Tobacco Use    Smoking status: Never     Passive exposure: Never    Smokeless tobacco: Never   Vaping Use    Vaping status: Never Used   Substance and Sexual Activity    Alcohol use: No    Drug use: No    Sexual activity: Yes     Partners: Female     Birth control/protection: None       Review of Systems   Constitutional:  Positive for fatigue  "(attributes to age). Negative for appetite change, chills, fever, unexpected weight gain and unexpected weight loss.   HENT:  Negative for sinus pressure and trouble swallowing. Postnasal drip: some.   Respiratory:  Positive for cough. Negative for chest tightness and shortness of breath.    Cardiovascular:  Negative for chest pain and palpitations.   Gastrointestinal:  Negative for abdominal pain, diarrhea, nausea and vomiting.   Endocrine: Negative for polydipsia.   Musculoskeletal:  Arthralgias: mild in general, attributes to age.   Skin:  Negative for rash.   Neurological:  Negative for syncope and weakness.       Objective   Vitals:    01/20/25 0858   BP: 128/84   BP Location: Left arm   Patient Position: Sitting   Cuff Size: Large Adult   Pulse: 82   Temp: 96.8 °F (36 °C)   TempSrc: Temporal   SpO2: 96%   Weight: 116 kg (255 lb 12.8 oz)   Height: 183 cm (72.05\")     Body mass index is 34.65 kg/m².    Physical Exam  Vitals and nursing note reviewed.   Constitutional:       General: He is not in acute distress.     Appearance: He is well-developed and well-groomed. He is not diaphoretic.   HENT:      Head: Normocephalic.      Right Ear: External ear normal. Decreased hearing noted. Right ear middle ear effusion: mild. Tympanic membrane is not erythematous.      Left Ear: External ear normal. No decreased hearing noted. Left ear middle ear effusion: mild. Tympanic membrane is not erythematous.      Nose: Nose normal.      Right Sinus: No maxillary sinus tenderness or frontal sinus tenderness.      Left Sinus: No maxillary sinus tenderness or frontal sinus tenderness.      Mouth/Throat:      Mouth: Mucous membranes are moist.      Pharynx: No oropharyngeal exudate or posterior oropharyngeal erythema.   Eyes:      Conjunctiva/sclera: Conjunctivae normal.   Neck:      Vascular: No carotid bruit.   Cardiovascular:      Rate and Rhythm: Normal rate and regular rhythm.      Pulses: Normal pulses.      Heart sounds: " Normal heart sounds. No murmur heard.  Pulmonary:      Effort: Pulmonary effort is normal. No respiratory distress.      Breath sounds: Normal breath sounds.   Chest:       Abdominal:      General: Bowel sounds are normal.      Palpations: Abdomen is soft. There is no hepatomegaly or splenomegaly.      Tenderness: There is no abdominal tenderness. There is no guarding.   Musculoskeletal:      Cervical back: Normal range of motion and neck supple.      Right lower leg: No edema.      Left lower leg: No edema.   Lymphadenopathy:      Cervical: No cervical adenopathy.   Skin:     General: Skin is warm and dry.      Findings: No rash.          Neurological:      Mental Status: He is alert and oriented to person, place, and time.      Gait: Gait normal.   Psychiatric:         Mood and Affect: Mood normal.         Behavior: Behavior normal.         Thought Content: Thought content normal.         Cognition and Memory: Cognition normal.         Judgment: Judgment normal.         Lab Results   Component Value Date    WBC 7.9 03/08/2024    RBC 6.22 (H) 03/08/2024    HGB 18.5 (H) 03/08/2024    HCT 55.0 (H) 03/08/2024    MCV 88 03/08/2024    MCH 29.7 03/08/2024    MCHC 33.6 03/08/2024    RDW 13.0 03/08/2024    RDWSD 47.9 06/22/2021    MPV 9.2 06/22/2021     03/08/2024    NEUTRORELPCT 65 03/08/2024    LYMPHORELPCT 22 03/08/2024    MONORELPCT 10 03/08/2024    EOSRELPCT 1 03/08/2024    BASORELPCT 1 03/08/2024    AUTOIGPER 0.8 (H) 06/22/2021    NEUTROABS 5.2 03/08/2024    LYMPHSABS 1.8 03/08/2024    MONOSABS 0.8 03/08/2024    EOSABS 0.1 03/08/2024    BASOSABS 0.1 03/08/2024    AUTOIGNUM 0.08 (H) 06/22/2021    NRBC 0.0 06/22/2021     Lab Results   Component Value Date    GLUCOSE 97 03/08/2024    BUN 22 03/08/2024    CREATININE 1.32 (H) 03/08/2024    EGFRIFNONA 65 06/22/2021    EGFRIFAFRI  09/23/2016      Comment:      <15 Indicative of kidney failure.    BCR 17 03/08/2024    K 4.9 03/08/2024    CO2 24 03/08/2024    CALCIUM  9.5 03/08/2024    PROTENTOTREF 7.6 03/08/2024    ALBUMIN 4.7 03/08/2024    LABIL2 1.6 03/08/2024    AST 20 03/08/2024    ALT 26 03/08/2024      Lab Results   Component Value Date    CHLPL 192 03/08/2024    TRIG 211 (H) 03/08/2024    HDL 36 (L) 03/08/2024    VLDL 37 03/08/2024     (H) 03/08/2024     Lab Results   Component Value Date    TSH 2.280 04/22/2021         Assessment    Problem List Items Addressed This Visit       Hypertension    Current Assessment & Plan     Hypertension is stable and controlled  Continue current treatment regimen.  Regular aerobic exercise.  Ambulatory blood pressure monitoring.  Blood pressure will be reassessed in 3 months.  Continue Amlodipine daily.         Relevant Orders    CBC & Differential    Comprehensive Metabolic Panel    Decreased hearing of right ear    Relevant Orders    Ambulatory Referral to ENT (Otolaryngology)    Lump of axilla, right - Primary    Relevant Orders    CBC & Differential    Comprehensive Metabolic Panel    Upper respiratory tract infection    Current Assessment & Plan     Increase intake of clear liquids and rest.  Try plain Mucinex to thin secretions.  Try nasal steroid, such as Flonase or Nasacort.  May try over the counter Delsym as needed for cough.             Return in about 3 months (around 4/20/2025) for Annual physical, Recheck.or sooner if symptoms persist or worsen.  Cough started 4-5 days ago; lumps in axilla started 1 week ago and seem to be resolving; discussed possible folliculitis; will check labs today and monitor symptoms for next couple of days; will consider Rx for Augmentin if symptoms persist.       COVID-19 Precautions - Patient was compliant in wearing a mask. When I saw the patient, I used appropriate personal protective equipment (PPE) including N95 mask, gloves, and eye shield (standard procedure).  Hand hygiene was completed before and after seeing the patient.

## 2025-01-21 ENCOUNTER — TELEPHONE (OUTPATIENT)
Dept: FAMILY MEDICINE CLINIC | Facility: CLINIC | Age: 60
End: 2025-01-21
Payer: COMMERCIAL

## 2025-01-21 DIAGNOSIS — J01.90 ACUTE NON-RECURRENT SINUSITIS, UNSPECIFIED LOCATION: Primary | ICD-10-CM

## 2025-01-21 LAB
ALBUMIN SERPL-MCNC: 4.1 G/DL (ref 3.5–5.2)
ALBUMIN/GLOB SERPL: 1.3 G/DL
ALP SERPL-CCNC: 55 U/L (ref 39–117)
ALT SERPL-CCNC: 20 U/L (ref 1–41)
AST SERPL-CCNC: 21 U/L (ref 1–40)
BASOPHILS # BLD AUTO: 0.07 10*3/MM3 (ref 0–0.2)
BASOPHILS NFR BLD AUTO: 1 % (ref 0–1.5)
BILIRUB SERPL-MCNC: 0.5 MG/DL (ref 0–1.2)
BUN SERPL-MCNC: 17 MG/DL (ref 8–23)
BUN/CREAT SERPL: 13.4 (ref 7–25)
CALCIUM SERPL-MCNC: 9 MG/DL (ref 8.6–10.5)
CHLORIDE SERPL-SCNC: 103 MMOL/L (ref 98–107)
CO2 SERPL-SCNC: 24.7 MMOL/L (ref 22–29)
CREAT SERPL-MCNC: 1.27 MG/DL (ref 0.76–1.27)
EGFRCR SERPLBLD CKD-EPI 2021: 64.3 ML/MIN/1.73
EOSINOPHIL # BLD AUTO: 0.3 10*3/MM3 (ref 0–0.4)
EOSINOPHIL NFR BLD AUTO: 4.1 % (ref 0.3–6.2)
ERYTHROCYTE [DISTWIDTH] IN BLOOD BY AUTOMATED COUNT: 12.9 % (ref 12.3–15.4)
GLOBULIN SER CALC-MCNC: 3.1 GM/DL
GLUCOSE SERPL-MCNC: 119 MG/DL (ref 65–99)
HCT VFR BLD AUTO: 49.8 % (ref 37.5–51)
HGB BLD-MCNC: 17 G/DL (ref 13–17.7)
IMM GRANULOCYTES # BLD AUTO: 0.04 10*3/MM3 (ref 0–0.05)
IMM GRANULOCYTES NFR BLD AUTO: 0.5 % (ref 0–0.5)
LYMPHOCYTES # BLD AUTO: 1.79 10*3/MM3 (ref 0.7–3.1)
LYMPHOCYTES NFR BLD AUTO: 24.3 % (ref 19.6–45.3)
MCH RBC QN AUTO: 29.5 PG (ref 26.6–33)
MCHC RBC AUTO-ENTMCNC: 34.1 G/DL (ref 31.5–35.7)
MCV RBC AUTO: 86.3 FL (ref 79–97)
MONOCYTES # BLD AUTO: 0.71 10*3/MM3 (ref 0.1–0.9)
MONOCYTES NFR BLD AUTO: 9.6 % (ref 5–12)
NEUTROPHILS # BLD AUTO: 4.45 10*3/MM3 (ref 1.7–7)
NEUTROPHILS NFR BLD AUTO: 60.5 % (ref 42.7–76)
NRBC BLD AUTO-RTO: 0 /100 WBC (ref 0–0.2)
PLATELET # BLD AUTO: 303 10*3/MM3 (ref 140–450)
POTASSIUM SERPL-SCNC: 4.6 MMOL/L (ref 3.5–5.2)
PROT SERPL-MCNC: 7.2 G/DL (ref 6–8.5)
RBC # BLD AUTO: 5.77 10*6/MM3 (ref 4.14–5.8)
SODIUM SERPL-SCNC: 138 MMOL/L (ref 136–145)
WBC # BLD AUTO: 7.36 10*3/MM3 (ref 3.4–10.8)

## 2025-01-21 NOTE — ASSESSMENT & PLAN NOTE
Increase intake of clear liquids and rest.  Try plain Mucinex to thin secretions.  Try nasal steroid, such as Flonase or Nasacort.  May try over the counter Delsym as needed for cough.

## 2025-01-22 NOTE — TELEPHONE ENCOUNTER
Spoke with patient over the phone; pt continues to have cough and congestion which wife reminded him started 8 days ago; no change in lumps in axilla; instructed to try plain Mucinex and nasal steroid as previously discussed; will send Rx for Augmentin; instructed to wait 2 days to start prescription; if symptoms persist or worsen then may start Augmentin; instructed to continue to monitor lumps in axilla and follow-up if symptoms persist or worsen.

## 2025-03-10 ENCOUNTER — TELEPHONE (OUTPATIENT)
Dept: FAMILY MEDICINE CLINIC | Facility: CLINIC | Age: 60
End: 2025-03-10
Payer: COMMERCIAL

## 2025-03-10 NOTE — TELEPHONE ENCOUNTER
"    Caller: Vini Jones \"Adrian\"    Relationship: Self    Best call back number: [phone]    Equipment requested: CPAP MACHINE    Reason for the request: ERROR ON THE MACHINE, STATING THE MOTOR HAS RUN ITS COURSE     Prescribing Provider: DR BIRCH     Additional information or concerns:  INSURANCE HAS REQUESTED A PRESCRIPTION FOR THIS, THEN THEY WILL WORK ON PROCESSING         "

## 2025-03-11 NOTE — TELEPHONE ENCOUNTER
[facility] TO RELAY     LVMTCB-Dr Miller states It appears the last sleep study was performed 3/19/2010 with an AHI of 11.7 that went up to 24.6 in the supine position without hypoxemia.  Apparently patient has been seen by pulmonology Dr. Ligia Mitchell 3/4/2020.  Patient will likely require a repeat sleep study therefore would recommend that we have follow-up with sleep specialist pulmonology Dr. Mitchell

## 2025-03-13 NOTE — TELEPHONE ENCOUNTER
"Name: Vini Jones \"Adrian\"      Relationship: Self      Best Callback Number: [phone]       HUB PROVIDED THE RELAY MESSAGE FROM THE OFFICE      PATIENT: VOICED UNDERSTANDING AND HAS NO FURTHER QUESTIONS AT THIS TIME    ADDITIONAL INFORMATION:   "

## 2025-03-19 ENCOUNTER — OFFICE VISIT (OUTPATIENT)
Dept: FAMILY MEDICINE CLINIC | Facility: CLINIC | Age: 60
End: 2025-03-19
Payer: COMMERCIAL

## 2025-03-19 VITALS
OXYGEN SATURATION: 96 % | TEMPERATURE: 97.3 F | SYSTOLIC BLOOD PRESSURE: 138 MMHG | HEIGHT: 72 IN | BODY MASS INDEX: 35.27 KG/M2 | DIASTOLIC BLOOD PRESSURE: 86 MMHG | HEART RATE: 84 BPM | WEIGHT: 260.4 LBS

## 2025-03-19 DIAGNOSIS — J06.9 UPPER RESPIRATORY TRACT INFECTION, UNSPECIFIED TYPE: Primary | ICD-10-CM

## 2025-03-19 DIAGNOSIS — R22.31 LUMP OF AXILLA, RIGHT: ICD-10-CM

## 2025-03-19 DIAGNOSIS — R06.2 WHEEZING: ICD-10-CM

## 2025-03-19 DIAGNOSIS — R05.1 ACUTE COUGH: ICD-10-CM

## 2025-03-19 PROCEDURE — 99213 OFFICE O/P EST LOW 20 MIN: CPT | Performed by: NURSE PRACTITIONER

## 2025-03-19 RX ORDER — DEXTROMETHORPHAN HYDROBROMIDE AND PROMETHAZINE HYDROCHLORIDE 15; 6.25 MG/5ML; MG/5ML
5 SYRUP ORAL NIGHTLY PRN
Qty: 118 ML | Refills: 0 | Status: SHIPPED | OUTPATIENT
Start: 2025-03-19

## 2025-03-19 RX ORDER — PREDNISONE 20 MG/1
TABLET ORAL
Qty: 7 TABLET | Refills: 0 | Status: SHIPPED | OUTPATIENT
Start: 2025-03-19 | End: 2025-03-25

## 2025-03-19 RX ORDER — AZITHROMYCIN 250 MG/1
TABLET, FILM COATED ORAL
Qty: 6 TABLET | Refills: 0 | Status: SHIPPED | OUTPATIENT
Start: 2025-03-19

## 2025-03-19 NOTE — PATIENT INSTRUCTIONS
Increase intake of clear liquids and rest.  May try over the counter Delsym as needed for cough during the day.  Try Promethazine DM for cough at night.  Follow up if symptoms persist or worsen.

## 2025-03-19 NOTE — PROGRESS NOTES
Subjective   Vini Jones is a 59 y.o. male.     Chief Complaint   Patient presents with    Cough    Nasal Congestion    Wheezing       History of Present Illness   Patient presents with c/o cough, nasal congestion, and wheezing; symptoms started 1-2 weeks ago; has had nonproductive cough; wheezing has improved since symptoms first started; has had trouble sleeping due to cough; would like cough medication; no fever; had a lot of malaise when symptoms first started but that has improved; has had fatigue due to persistent cough; no SOA; no ear pain or sore throat; no nausea, vomiting or diarrhea; has tried Flonase last few days and takes daily antihistamine nightly; no known exposure to illness.    F/U lumps in axilla: lumps resolved with antibiotic, Augmentin.    Saw ENT for decreased hearing on right and will be getting hearing aid.      The following portions of the patient's history were reviewed and updated as appropriate: allergies, current medications, past family history, past medical history, past social history, past surgical history and problem list.    Current Outpatient Medications on File Prior to Visit   Medication Sig    amLODIPine (NORVASC) 2.5 MG tablet Take 1 tablet by mouth Every Evening.    AXIRON 30 MG/ACT solution Daily.    famotidine (PEPCID) 20 MG tablet TAKE 1 TABLET AT NIGHT AS NEEDED FOR HEARTBURN    sildenafil (REVATIO) 20 MG tablet Take 1 tablet by mouth 3 (Three) Times a Day As Needed.    traMADol (ULTRAM) 50 MG tablet TAKE 1 TABLET EVERY 8 HOURS AS NEEDED FOR MODERATE PAIN    [DISCONTINUED] rizatriptan MLT (MAXALT-MLT) 10 MG disintegrating tablet Take 1 tablet by mouth. (Patient not taking: Reported on 3/19/2025)     No current facility-administered medications on file prior to visit.        Past Medical History:   Diagnosis Date    Abdominal pain     Abnormal ECG     Not sure - Very minor per Doctor    Allergic     Benign prostatic hyperplasia 6 yrs ago    Ongoing    Colon polyp      Diverticulosis     Enlarged prostate     GERD (gastroesophageal reflux disease)     Hypertension     Low back pain     Pain involving joint of finger of right hand 01/03/2024    Pancreatitis 15 yrs ago    1 time    Sleep apnea     C-Pap.. INSTRUCTED TO BRING DOS        Past Surgical History:   Procedure Laterality Date    APPENDECTOMY      BICEPS TENDON REPAIR Right 06/30/2021    Procedure: RIGHT ELBOW EXCISION OF THE FRACTURED OSTEOPHYTE WITH REPAIR OF THE TRICEPS TENDON;  Surgeon: Martín Tena MD;  [place] [facility] [place] [facility] [place]  Service: Orthopedics;  Laterality: Right;    BIOPSY PROSTATE NEEDLE / PUNCH / INCISIONAL      CHOLECYSTECTOMY      COLONOSCOPY      FRACTURE SURGERY  2 years ago    PROSTATE SURGERY  biopsy    2 times       Family History   Problem Relation Age of Onset    Arthritis Mother         Jones    Heart disease Father         adpoted father    Asthma Father         adopted father    Heart attack Father     Malig Hyperthermia Neg Hx        Social History     Socioeconomic History    Marital status:    Tobacco Use    Smoking status: Never     Passive exposure: Never    Smokeless tobacco: Never   Vaping Use    Vaping status: Never Used   Substance and Sexual Activity    Alcohol use: No    Drug use: No    Sexual activity: Yes     Partners: Female     Birth control/protection: None       Review of Systems   Constitutional:  Positive for fatigue. Negative for appetite change, chills, fever, unexpected weight gain and unexpected weight loss.   HENT:  Positive for rhinorrhea. Negative for ear pain, postnasal drip, sinus pressure, sore throat and trouble swallowing.    Eyes:  Negative for discharge. Blurred vision: no concern.  Respiratory:  Positive for cough and wheezing. Negative for chest tightness and shortness of breath.    Cardiovascular:  Negative for chest pain, palpitations and leg swelling.   Gastrointestinal:  Negative for abdominal pain.   Musculoskeletal:  Negative for myalgias.  "  Skin:  Negative for rash.   Neurological:  Negative for dizziness, light-headedness and headache.       Objective   Vitals:    03/19/25 1302 03/19/25 1330   BP: 138/86    BP Location: Left arm    Patient Position: Sitting    Cuff Size: Large Adult    Pulse: 100 84   Temp: 97.3 °F (36.3 °C)    TempSrc: Temporal    SpO2: 96%    Weight: 118 kg (260 lb 6.4 oz)    Height: 183 cm (72.05\")      Body mass index is 35.27 kg/m².    Physical Exam  Vitals and nursing note reviewed.   Constitutional:       General: He is not in acute distress.     Appearance: He is well-developed and well-groomed. He is not diaphoretic.   HENT:      Head: Normocephalic.      Right Ear: External ear normal. Decreased hearing noted. Right ear middle ear effusion: TM dull. Tympanic membrane is not erythematous.      Left Ear: External ear normal. No decreased hearing noted. Left ear middle ear effusion: mild. Tympanic membrane is not erythematous.      Nose: Nose normal.      Right Sinus: No maxillary sinus tenderness or frontal sinus tenderness.      Left Sinus: No maxillary sinus tenderness or frontal sinus tenderness.      Mouth/Throat:      Mouth: Mucous membranes are moist.      Pharynx: No oropharyngeal exudate. Posterior oropharyngeal erythema: mild.  Eyes:      Conjunctiva/sclera: Conjunctivae normal.   Neck:      Vascular: No carotid bruit.   Cardiovascular:      Rate and Rhythm: Normal rate and regular rhythm.      Pulses: Normal pulses.      Heart sounds: Normal heart sounds. No murmur heard.  Pulmonary:      Effort: Pulmonary effort is normal. No respiratory distress.      Breath sounds: Normal breath sounds. No decreased breath sounds or rales. Wheezes: occasional wheeze and will trigger cough.  Abdominal:      General: Bowel sounds are normal.      Palpations: Abdomen is soft. There is no hepatomegaly or splenomegaly.      Tenderness: There is no abdominal tenderness. There is no guarding.   Musculoskeletal:      Cervical back: " Normal range of motion and neck supple.      Right lower leg: No edema.      Left lower leg: No edema.   Lymphadenopathy:      Cervical: No cervical adenopathy.   Skin:     General: Skin is warm and dry.      Findings: No rash.   Neurological:      Mental Status: He is alert and oriented to person, place, and time.      Gait: Gait normal.   Psychiatric:         Mood and Affect: Mood normal.         Behavior: Behavior normal.         Thought Content: Thought content normal.         Cognition and Memory: Cognition normal.         Judgment: Judgment normal.         Lab Results   Component Value Date    WBC 7.36 01/20/2025    RBC 5.77 01/20/2025    HGB 17.0 01/20/2025    HCT 49.8 01/20/2025    MCV 86.3 01/20/2025    MCH 29.5 01/20/2025    MCHC 34.1 01/20/2025    RDW 12.9 01/20/2025    RDWSD 47.9 06/22/2021    MPV 9.2 06/22/2021     01/20/2025    NEUTRORELPCT 60.5 01/20/2025    LYMPHORELPCT 24.3 01/20/2025    MONORELPCT 9.6 01/20/2025    EOSRELPCT 4.1 01/20/2025    BASORELPCT 1.0 01/20/2025    AUTOIGPER 0.8 (H) 06/22/2021    NEUTROABS 4.45 01/20/2025    LYMPHSABS 1.79 01/20/2025    MONOSABS 0.71 01/20/2025    EOSABS 0.30 01/20/2025    BASOSABS 0.07 01/20/2025    AUTOIGNUM 0.08 (H) 06/22/2021    NRBC 0.0 01/20/2025     Lab Results   Component Value Date    GLUCOSE 119 (H) 01/20/2025    BUN 17 01/20/2025    CREATININE 1.27 01/20/2025    EGFRIFNONA 65 06/22/2021    EGFRIFAFRI  09/23/2016      Comment:      <15 Indicative of kidney failure.    BCR 13.4 01/20/2025    K 4.6 01/20/2025    CO2 24.7 01/20/2025    CALCIUM 9.0 01/20/2025    ALBUMIN 4.1 01/20/2025    AST 21 01/20/2025    ALT 20 01/20/2025      Lab Results   Component Value Date    CHLPL 192 03/08/2024    TRIG 211 (H) 03/08/2024    HDL 36 (L) 03/08/2024    VLDL 37 03/08/2024     (H) 03/08/2024     Lab Results   Component Value Date    TSH 2.280 04/22/2021     Lab Results   Component Value Date    RBCUA 0-2 (A) 05/10/2021    BACTERIA None Seen 05/10/2021     COLORU Yellow 05/10/2021    CLARITYU Clear 05/10/2021    LEUKOCYTESUR Negative 05/10/2021    GLUCOSEU Negative 05/10/2021    BLOODU Trace (A) 05/10/2021    BILIRUBINUR Negative 05/10/2021    NITRITEU Negative 05/10/2021          Assessment    Problem List Items Addressed This Visit       RESOLVED: Lump of axilla, right    Current Assessment & Plan   Resolved with Augmentin.         Upper respiratory tract infection - Primary    Current Assessment & Plan   Increase intake of clear liquids and rest.  May try over the counter Delsym as needed for cough during the day.  Try Promethazine DM for cough at night.         Relevant Medications    azithromycin (Zithromax Z-Adarsh) 250 MG tablet    Wheezing    Relevant Medications    predniSONE (DELTASONE) 20 MG tablet     Other Visit Diagnoses         Acute cough        Relevant Medications    promethazine-dextromethorphan (PROMETHAZINE-DM) 6.25-15 MG/5ML syrup                 Return in about 2 months (around 5/19/2025) for Annual physical, Recheck.or sooner if symptoms persist or worsen.  Will send Rx for Prednisone for wheezing and cough; discussed possible SE with Prednisone; will also send Rx for Azithromycin due to persistent cough and URI symptoms for about 2 weeks.         COVID-19 Precautions - Patient was compliant in wearing a mask. When I saw the patient, I used appropriate personal protective equipment (PPE) including N95 mask, gloves, and eye shield (standard procedure).  Hand hygiene was completed before and after seeing the patient.

## 2025-03-20 NOTE — ASSESSMENT & PLAN NOTE
Increase intake of clear liquids and rest.  May try over the counter Delsym as needed for cough during the day.  Try Promethazine DM for cough at night.

## 2025-03-26 ENCOUNTER — OFFICE VISIT (OUTPATIENT)
Dept: FAMILY MEDICINE CLINIC | Facility: CLINIC | Age: 60
End: 2025-03-26
Payer: COMMERCIAL

## 2025-03-26 VITALS
SYSTOLIC BLOOD PRESSURE: 130 MMHG | BODY MASS INDEX: 34.67 KG/M2 | DIASTOLIC BLOOD PRESSURE: 90 MMHG | HEIGHT: 72 IN | WEIGHT: 256 LBS | HEART RATE: 119 BPM | OXYGEN SATURATION: 96 %

## 2025-03-26 DIAGNOSIS — I73.00 RAYNAUD'S PHENOMENON WITHOUT GANGRENE: Primary | ICD-10-CM

## 2025-03-26 PROCEDURE — 99214 OFFICE O/P EST MOD 30 MIN: CPT | Performed by: INTERNAL MEDICINE

## 2025-03-26 RX ORDER — ASPIRIN 325 MG
325 TABLET, DELAYED RELEASE (ENTERIC COATED) ORAL DAILY
Start: 2025-03-26

## 2025-03-26 NOTE — PROGRESS NOTES
Subjective   Vini Jones is a 59 y.o. male.     Chief Complaint   Patient presents with    Pain     Pain with numbness of index finger right hand with soreness of palm - no injury - symptoms for 1-2 weeks       History of Present Illness   Patient has been having symptoms for the last 1 to 2 weeks of pain (cold and tingling in the tip of the right index finger only) and numbness in the right hand in the palm and the index finger.  No history of injury or trauma.  Only changes in medications was on prednisone and zpack for the last week and done.    Follow-up for hypertension.  Currently, has been feeling well and asymptomatic without any headaches, vision changes, cough, chest pain, shortness of breath, swelling, focal neurologic deficit, memory loss or syncope.  Has been taking the medications regularly and adherent with the regimen amlodipine 2.5 mg.  Denies medication side effects and no significant interval events.       History of BPH requiring self catheterization and followed by Dr Felix Pearce.  PSA followed and has history of high PSA with biopsy negative.  History of low testosterone and on axiron 2 pumps daily with ED and uses the sildenafil.  Patient is currently seeing urology Dr. Lambert Pearce and noted to have an elevated PSA on 3/28/24 of 18.05.  MRI of the prostate on 5/16/2024 demonstrated severe BPH but no other findings.     History of ocular migraine and given rizatriptan and resolved.     History of back pain and uses tramadol as needed only uses 30 tabs for several months.  No new issues at this time.  Last tramadol refill on 9/4/24 of #21 tabs.  Last tox assure on 3/8/2024 and appropriate.  Last pain management agreement signed on 1/27/2022 and unchanged.     Patient has history of obstructive sleep apnea utilizing CPAP   The following portions of the patient's history were reviewed and updated as appropriate: allergies, current medications, past family history, past medical history,  past social history, past surgical history and problem list.    Depression Screen:      3/19/2025     1:04 PM   PHQ-2/PHQ-9 Depression Screening   Little interest or pleasure in doing things Not at all   Feeling down, depressed, or hopeless Not at all   How difficult have these problems made it for you to do your work, take care of things at home, or get along with other people? Not difficult at all       Past Medical History:   Diagnosis Date    Abdominal pain     Abnormal ECG     Not sure - Very minor per Doctor    Allergic     Benign prostatic hyperplasia 6 yrs ago    Ongoing    Colon polyp     Diverticulosis     Enlarged prostate     GERD (gastroesophageal reflux disease)     Hypertension     Low back pain     Pain involving joint of finger of right hand 01/03/2024    Pancreatitis 15 yrs ago    1 time    Sleep apnea     C-Pap.. INSTRUCTED TO BRING DOS        Past Surgical History:   Procedure Laterality Date    APPENDECTOMY      BICEPS TENDON REPAIR Right 06/30/2021    Procedure: RIGHT ELBOW EXCISION OF THE FRACTURED OSTEOPHYTE WITH REPAIR OF THE TRICEPS TENDON;  Surgeon: Martín Tena MD;  Location: [facility] OR [facility] [place];  Service: Orthopedics;  Laterality: Right;    BIOPSY PROSTATE NEEDLE / PUNCH / INCISIONAL      CHOLECYSTECTOMY      COLONOSCOPY      FRACTURE SURGERY  2 years ago    PROSTATE SURGERY  biopsy    2 times       Family History   Problem Relation Age of Onset    Arthritis Mother         Jones    Heart disease Father         adpoted father    Asthma Father         adopted father    Heart attack Father     Malig Hyperthermia Neg Hx        Social History     Socioeconomic History    Marital status:    Tobacco Use    Smoking status: Never     Passive exposure: Never    Smokeless tobacco: Never   Vaping Use    Vaping status: Never Used   Substance and Sexual Activity    Alcohol use: No    Drug use: No    Sexual activity: Yes     Partners: Female     Birth control/protection: None       Current  Outpatient Medications   Medication Sig Dispense Refill    amLODIPine (NORVASC) 2.5 MG tablet Take 1 tablet by mouth Every Evening. 90 tablet 3    AXIRON 30 MG/ACT solution Daily.      famotidine (PEPCID) 20 MG tablet TAKE 1 TABLET AT NIGHT AS NEEDED FOR HEARTBURN 90 tablet 3    sildenafil (REVATIO) 20 MG tablet Take 1 tablet by mouth 3 (Three) Times a Day As Needed.      traMADol (ULTRAM) 50 MG tablet TAKE 1 TABLET EVERY 8 HOURS AS NEEDED FOR MODERATE PAIN 21 tablet 0    aspirin 325 MG EC tablet Take 1 tablet by mouth Daily.       No current facility-administered medications for this visit.       Review of Systems   Constitutional:  Negative for activity change, appetite change, fatigue, fever, unexpected weight gain and unexpected weight loss.   HENT:  Negative for nosebleeds, rhinorrhea, trouble swallowing and voice change.    Eyes:  Negative for visual disturbance.   Respiratory:  Negative for cough, chest tightness, shortness of breath and wheezing.    Cardiovascular:  Negative for chest pain, palpitations and leg swelling.   Gastrointestinal:  Negative for abdominal pain, blood in stool, constipation, diarrhea, nausea, vomiting, GERD and indigestion.   Genitourinary:  Negative for dysuria, frequency and hematuria.   Musculoskeletal:  Negative for arthralgias, back pain and myalgias.        Tingling discomfort in the palm of the right hand and a cold tingling sensation with purpleish discoloration of the distal finger of the index finger.  No open wounds or drainage.   Skin:  Negative for rash and wound.   Neurological:  Negative for dizziness, tremors, weakness, light-headedness, numbness, headache and memory problem.   Hematological:  Negative for adenopathy. Does not bruise/bleed easily.   Psychiatric/Behavioral:  Negative for sleep disturbance and depressed mood. The patient is not nervous/anxious.        Objective   /90 (BP Location: Left arm, Patient Position: Sitting, Cuff Size: Adult)   Pulse 119  "  Ht 182.9 cm (72\")   Wt 116 kg (256 lb)   SpO2 96%   BMI 34.72 kg/m²     Physical Exam  Vitals and nursing note reviewed.   Constitutional:       General: He is not in acute distress.     Appearance: He is well-developed. He is not diaphoretic.   HENT:      Head: Normocephalic and atraumatic.      Right Ear: External ear normal.      Left Ear: External ear normal.      Nose: Nose normal.   Eyes:      Conjunctiva/sclera: Conjunctivae normal.      Pupils: Pupils are equal, round, and reactive to light.   Neck:      Thyroid: No thyromegaly.      Trachea: No tracheal deviation.   Cardiovascular:      Rate and Rhythm: Normal rate and regular rhythm.      Heart sounds: Normal heart sounds. No murmur heard.     No friction rub. No gallop.   Pulmonary:      Effort: Pulmonary effort is normal. No respiratory distress.      Breath sounds: Normal breath sounds.   Abdominal:      General: Bowel sounds are normal.      Palpations: Abdomen is soft. There is no mass.      Tenderness: There is no abdominal tenderness. There is no guarding.   Musculoskeletal:         General: Normal range of motion.      Cervical back: Normal range of motion and neck supple.      Comments: Right hand to the palmar aspect near base of thumb with an area with some red skin changes but no underlying induration fluctuation or tenderness.  Right index finger distally with some mild purplish discoloration and slight decrease in cap refill but sensation was intact.   Lymphadenopathy:      Cervical: No cervical adenopathy.   Skin:     General: Skin is warm and dry.      Capillary Refill: Capillary refill takes less than 2 seconds.      Findings: No rash.   Neurological:      Mental Status: He is alert and oriented to person, place, and time.      Motor: No abnormal muscle tone.      Deep Tendon Reflexes: Reflexes normal.   Psychiatric:         Behavior: Behavior normal.         Thought Content: Thought content normal.         Judgment: Judgment normal. "         Recent Results (from the past 12 weeks)   CBC & Differential    Collection Time: 01/20/25  9:51 AM    Specimen: Blood   Result Value Ref Range    WBC 7.36 3.40 - 10.80 10*3/mm3    RBC 5.77 4.14 - 5.80 10*6/mm3    Hemoglobin 17.0 13.0 - 17.7 g/dL    Hematocrit 49.8 37.5 - 51.0 %    MCV 86.3 79.0 - 97.0 fL    MCH 29.5 26.6 - 33.0 pg    MCHC 34.1 31.5 - 35.7 g/dL    RDW 12.9 12.3 - 15.4 %    Platelets 303 140 - 450 10*3/mm3    Neutrophil Rel % 60.5 42.7 - 76.0 %    Lymphocyte Rel % 24.3 19.6 - 45.3 %    Monocyte Rel % 9.6 5.0 - 12.0 %    Eosinophil Rel % 4.1 0.3 - 6.2 %    Basophil Rel % 1.0 0.0 - 1.5 %    Neutrophils Absolute 4.45 1.70 - 7.00 10*3/mm3    Lymphocytes Absolute 1.79 0.70 - 3.10 10*3/mm3    Monocytes Absolute 0.71 0.10 - 0.90 10*3/mm3    Eosinophils Absolute 0.30 0.00 - 0.40 10*3/mm3    Basophils Absolute 0.07 0.00 - 0.20 10*3/mm3    Immature Granulocyte Rel % 0.5 0.0 - 0.5 %    Immature Grans Absolute 0.04 0.00 - 0.05 10*3/mm3    nRBC 0.0 0.0 - 0.2 /100 WBC   Comprehensive Metabolic Panel    Collection Time: 01/20/25  9:51 AM    Specimen: Blood   Result Value Ref Range    Glucose 119 (H) 65 - 99 mg/dL    BUN 17 8 - 23 mg/dL    Creatinine 1.27 0.76 - 1.27 mg/dL    EGFR Result 64.3 >60.0 mL/min/1.73    BUN/Creatinine Ratio 13.4 7.0 - 25.0    Sodium 138 136 - 145 mmol/L    Potassium 4.6 3.5 - 5.2 mmol/L    Chloride 103 98 - 107 mmol/L    Total CO2 24.7 22.0 - 29.0 mmol/L    Calcium 9.0 8.6 - 10.5 mg/dL    Total Protein 7.2 6.0 - 8.5 g/dL    Albumin 4.1 3.5 - 5.2 g/dL    Globulin 3.1 gm/dL    A/G Ratio 1.3 g/dL    Total Bilirubin 0.5 0.0 - 1.2 mg/dL    Alkaline Phosphatase 55 39 - 117 U/L    AST (SGOT) 21 1 - 40 U/L    ALT (SGPT) 20 1 - 41 U/L     Assessment & Plan   Diagnoses and all orders for this visit:    1. Raynaud's phenomenon without gangrene (Primary)  -     CBC & Differential  -     D-dimer, Quantitative    Other orders  -     aspirin 325 MG EC tablet; Take 1 tablet by mouth Daily.    I  discussed with patient symptoms similar consistent with a Raynaud's phenomenon without any evidence of gangrene but could also be related to a blood flow issue.  Noted a does have a history of testosterone treatment and elevated RBCs.  I recommended that he try aspirin 325 daily and we obtain a CBC D-dimer.  We may have to be referring him to have further evaluation by hand or vascular.           Dictated utilizing Dragon Dictation

## 2025-03-27 LAB
BASOPHILS # BLD AUTO: 0.1 X10E3/UL (ref 0–0.2)
BASOPHILS NFR BLD AUTO: 1 %
D DIMER PPP FEU-MCNC: 0.5 MG/L FEU (ref 0–0.49)
EOSINOPHIL # BLD AUTO: 0.2 X10E3/UL (ref 0–0.4)
EOSINOPHIL NFR BLD AUTO: 2 %
ERYTHROCYTE [DISTWIDTH] IN BLOOD BY AUTOMATED COUNT: 13.7 % (ref 11.6–15.4)
HCT VFR BLD AUTO: 53.1 % (ref 37.5–51)
HGB BLD-MCNC: 17.1 G/DL (ref 13–17.7)
IMM GRANULOCYTES # BLD AUTO: 0 X10E3/UL (ref 0–0.1)
IMM GRANULOCYTES NFR BLD AUTO: 0 %
LYMPHOCYTES # BLD AUTO: 1.5 X10E3/UL (ref 0.7–3.1)
LYMPHOCYTES NFR BLD AUTO: 14 %
MCH RBC QN AUTO: 28.9 PG (ref 26.6–33)
MCHC RBC AUTO-ENTMCNC: 32.2 G/DL (ref 31.5–35.7)
MCV RBC AUTO: 90 FL (ref 79–97)
MONOCYTES # BLD AUTO: 1.1 X10E3/UL (ref 0.1–0.9)
MONOCYTES NFR BLD AUTO: 10 %
NEUTROPHILS # BLD AUTO: 7.8 X10E3/UL (ref 1.4–7)
NEUTROPHILS NFR BLD AUTO: 73 %
PLATELET # BLD AUTO: 307 X10E3/UL (ref 150–450)
RBC # BLD AUTO: 5.92 X10E6/UL (ref 4.14–5.8)
WBC # BLD AUTO: 10.5 X10E3/UL (ref 3.4–10.8)

## 2025-04-08 DIAGNOSIS — M79.646 PAIN OF FINGER, UNSPECIFIED LATERALITY: ICD-10-CM

## 2025-04-08 DIAGNOSIS — I73.00 RAYNAUD'S PHENOMENON WITHOUT GANGRENE: Primary | ICD-10-CM

## 2025-06-19 ENCOUNTER — TELEPHONE (OUTPATIENT)
Dept: FAMILY MEDICINE CLINIC | Facility: CLINIC | Age: 60
End: 2025-06-19
Payer: COMMERCIAL

## 2025-06-19 DIAGNOSIS — I73.00 RAYNAUD'S PHENOMENON WITHOUT GANGRENE: Primary | ICD-10-CM

## 2025-06-19 NOTE — TELEPHONE ENCOUNTER
Patient called and stated he had previously discussed a vascular problem with Dr Miller. He further stated that Dr Miller said he would put in a vascular referral if it continued and he is asking for that now.

## 2025-07-01 ENCOUNTER — OFFICE VISIT (OUTPATIENT)
Age: 60
End: 2025-07-01
Payer: COMMERCIAL

## 2025-07-01 VITALS
SYSTOLIC BLOOD PRESSURE: 151 MMHG | HEART RATE: 83 BPM | HEIGHT: 72 IN | BODY MASS INDEX: 35.21 KG/M2 | WEIGHT: 260 LBS | DIASTOLIC BLOOD PRESSURE: 83 MMHG

## 2025-07-01 DIAGNOSIS — I73.81 ERYTHROMELALGIA: ICD-10-CM

## 2025-07-01 DIAGNOSIS — D75.1 ERYTHROCYTOSIS: Primary | ICD-10-CM

## 2025-07-01 RX ORDER — RIZATRIPTAN BENZOATE 10 MG/1
TABLET, ORALLY DISINTEGRATING ORAL
COMMUNITY

## 2025-07-01 RX ORDER — SILODOSIN 8 MG/1
CAPSULE ORAL
COMMUNITY

## 2025-07-01 NOTE — PROGRESS NOTES
"Chief Complaint  raynaud's  (Hands and feet )    Subjective        Vini Jones presents to [facility] VASCULAR SURGERY  History of Present Illness    Nice gentleman who has 3 months ago right index finger discoloration associated with a warm bath.  This was self-limiting.  Was mildly painful.  Separate and remote to that event he had painful discoloration with purple discoloration of his great toe.  This is also associated with a warm bath.    Objective   Vital Signs:  /83   Pulse 83   Ht 182.9 cm (72\")   Wt 118 kg (260 lb)   BMI 35.26 kg/m²   Estimated body mass index is 35.26 kg/m² as calculated from the following:    Height as of this encounter: 182.9 cm (72\").    Weight as of this encounter: 118 kg (260 lb).         Vnii Jones  reports that he has never smoked. He has never been exposed to tobacco smoke. He has never used smokeless tobacco.      Physical Exam  Constitutional:       Appearance: He is well-developed.   Pulmonary:      Effort: Pulmonary effort is normal. No respiratory distress.   Abdominal:      General: There is no distension.      Palpations: Abdomen is soft.   Neurological:      Mental Status: He is alert and oriented to person, place, and time.     Easily palpable 2+ dorsalis pedis artery and radial arteries bilaterally.    Result Review :    The following data was reviewed by: Sourav Carter MD on 07/01/25  CBC          1/20/2025    09:51 3/26/2025    12:09   CBC   WBC 7.36  10.5    RBC 5.77  5.92    Hemoglobin 17.0  17.1    Hematocrit 49.8  53.1    MCV 86.3  90    MCH 29.5  28.9    MCHC 34.1  32.2    RDW 12.9  13.7    Platelets 303  307       BMP          1/20/2025    09:51   BMP   BUN 17    Creatinine 1.27    Sodium 138    Potassium 4.6    Chloride 103    CO2 24.7    Calcium 9.0           Data reviewed : Radiologic studies as below and Cardiology studies as below  Vascular Surgical History:    Vascular Imaging History:      (Text in bold font has " been individually reviewed by myself and confirmed)         Assessment and Plan     Vascular surgery following for:  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 Erythrocytosis (Primary)  -     Ambulatory Referral to Hematology / Oncology    2. Erythromelalgia  -     Ambulatory Referral to Hematology / Oncology    Nice 59-year-old gentleman whose roughly been on Axiron for about 4 years due to low testosterone levels.  Over the last 3 months he has had 2 episodes of what I suspect is erythromelalgia.  1 episode being in the right index finger and the second episode being in the left medial forefoot.  Both these episodes were self-limiting.  Not associated with any gangrenous skin changes.  They were mildly painful.  I do not believe this represents Raynaud's phenomenon is the isolated digital distribution and warm inciting event would not be consistent with Raynaud's.  Other options would be for atheroembolization from a central source.  Patient has limited vascular risk factors.  Patient also states the events were only mildly painful where typically atheroembolization is a incredibly painful event.  I suspect this is related to a hyperviscosity state brought about by his erythrocytosis secondary to testosterone administration.  Patient has been previously told that he needs periodic blood donations to keep this in check.  I am requesting the patient see hematology/oncology for a more nuanced/supervised management of his erythrocytosis.  Recommend continue taking 325 mg of aspirin.  I be more than happy to see this gentleman back on an as-needed basis.        Vascular medical management:Patient is on aspirin 325 daily.  Return if symptoms worsen or fail to improve.  Patient was given instructions and counseling regarding his condition or for health maintenance advice. Please see specific information pulled into the AVS if  appropriate.

## (undated) DEVICE — DRSNG GZ PETROLTM XEROFORM CURAD 1X8IN STRL

## (undated) DEVICE — BNDG ELAS ELITE V/CLOSE 6IN 5YD LF STRL

## (undated) DEVICE — UNDYED BRAIDED (POLYGLACTIN 910), SYNTHETIC ABSORBABLE SUTURE: Brand: COATED VICRYL

## (undated) DEVICE — STCKNT IMPERV 9X36IN STRL

## (undated) DEVICE — PATIENT RETURN ELECTRODE, SINGLE-USE, CONTACT QUALITY MONITORING, ADULT, WITH 9FT CORD, FOR PATIENTS WEIGING OVER 33LBS. (15KG): Brand: MEGADYNE

## (undated) DEVICE — GLV SURG BIOGEL LTX PF 8

## (undated) DEVICE — UNDERCAST PADDING: Brand: DEROYAL

## (undated) DEVICE — PENCL E/S ULTRAVAC TELESCP NOSE HOLSTR 10FT

## (undated) DEVICE — DRAPE,U/ SHT,SPLIT,PLAS,STERIL: Brand: MEDLINE

## (undated) DEVICE — TRAP FLD MINIVAC MEGADYNE 100ML

## (undated) DEVICE — GLV SURG SIGNATURE ESSENTIAL PF LTX SZ8

## (undated) DEVICE — SPLNT PLSTR ORTHO 5IN

## (undated) DEVICE — SKIN PREP TRAY W/CHG: Brand: MEDLINE INDUSTRIES, INC.

## (undated) DEVICE — INTENDED FOR TISSUE SEPARATION, AND OTHER PROCEDURES THAT REQUIRE A SHARP SURGICAL BLADE TO PUNCTURE OR CUT.: Brand: BARD-PARKER ® CARBON RIB-BACK BLADES

## (undated) DEVICE — BNDG ELAS ELITE V/CLOSE 4IN 5YD LF STRL

## (undated) DEVICE — SUT VIC 2/0 CT2 27IN J269H

## (undated) DEVICE — 1010 S-DRAPE TOWEL DRAPE 10/BX: Brand: STERI-DRAPE™

## (undated) DEVICE — PK ORTHO MINOR TOWER 40